# Patient Record
Sex: MALE | Race: WHITE
[De-identification: names, ages, dates, MRNs, and addresses within clinical notes are randomized per-mention and may not be internally consistent; named-entity substitution may affect disease eponyms.]

---

## 2018-07-31 ENCOUNTER — HOSPITAL ENCOUNTER (INPATIENT)
Dept: HOSPITAL 45 - C.EDA | Age: 68
LOS: 8 days | Discharge: ANOTHER HOSPITAL | DRG: 286 | End: 2018-08-08
Attending: HOSPITALIST | Admitting: HOSPITALIST
Payer: COMMERCIAL

## 2018-07-31 VITALS
WEIGHT: 272.93 LBS | HEIGHT: 67 IN | BODY MASS INDEX: 42.84 KG/M2 | BODY MASS INDEX: 42.84 KG/M2 | WEIGHT: 272.93 LBS | BODY MASS INDEX: 42.84 KG/M2 | HEIGHT: 67 IN

## 2018-07-31 VITALS — HEART RATE: 102 BPM | SYSTOLIC BLOOD PRESSURE: 110 MMHG | DIASTOLIC BLOOD PRESSURE: 73 MMHG

## 2018-07-31 VITALS
OXYGEN SATURATION: 93 % | DIASTOLIC BLOOD PRESSURE: 71 MMHG | HEART RATE: 100 BPM | SYSTOLIC BLOOD PRESSURE: 104 MMHG | TEMPERATURE: 98.96 F

## 2018-07-31 VITALS
SYSTOLIC BLOOD PRESSURE: 120 MMHG | TEMPERATURE: 99.5 F | DIASTOLIC BLOOD PRESSURE: 75 MMHG | OXYGEN SATURATION: 92 % | HEART RATE: 112 BPM

## 2018-07-31 VITALS — HEART RATE: 116 BPM | TEMPERATURE: 98.78 F | DIASTOLIC BLOOD PRESSURE: 70 MMHG | SYSTOLIC BLOOD PRESSURE: 113 MMHG

## 2018-07-31 DIAGNOSIS — E03.9: ICD-10-CM

## 2018-07-31 DIAGNOSIS — I70.232: ICD-10-CM

## 2018-07-31 DIAGNOSIS — L03.116: ICD-10-CM

## 2018-07-31 DIAGNOSIS — Z87.891: ICD-10-CM

## 2018-07-31 DIAGNOSIS — I35.0: Primary | ICD-10-CM

## 2018-07-31 DIAGNOSIS — K56.7: ICD-10-CM

## 2018-07-31 DIAGNOSIS — I25.10: ICD-10-CM

## 2018-07-31 DIAGNOSIS — Z79.01: ICD-10-CM

## 2018-07-31 DIAGNOSIS — I11.0: ICD-10-CM

## 2018-07-31 DIAGNOSIS — I48.2: ICD-10-CM

## 2018-07-31 DIAGNOSIS — I24.8: ICD-10-CM

## 2018-07-31 DIAGNOSIS — I48.0: ICD-10-CM

## 2018-07-31 DIAGNOSIS — I70.242: ICD-10-CM

## 2018-07-31 DIAGNOSIS — E11.65: ICD-10-CM

## 2018-07-31 DIAGNOSIS — I50.31: ICD-10-CM

## 2018-07-31 LAB
ALBUMIN SERPL-MCNC: 3.9 GM/DL (ref 3.4–5)
ALP SERPL-CCNC: 59 U/L (ref 45–117)
ALT SERPL-CCNC: 37 U/L (ref 12–78)
AST SERPL-CCNC: 53 U/L (ref 15–37)
BASOPHILS # BLD: 0.03 K/UL (ref 0–0.2)
BASOPHILS NFR BLD: 0.2 %
BUN SERPL-MCNC: 28 MG/DL (ref 7–18)
CALCIUM SERPL-MCNC: 9.5 MG/DL (ref 8.5–10.1)
CO2 SERPL-SCNC: 21 MMOL/L (ref 21–32)
CREAT SERPL-MCNC: 1.16 MG/DL (ref 0.6–1.4)
EOS ABS #: 0.05 K/UL (ref 0–0.5)
EOSINOPHIL NFR BLD AUTO: 140 K/UL (ref 130–400)
GLUCOSE SERPL-MCNC: 226 MG/DL (ref 70–99)
HCT VFR BLD CALC: 51.7 % (ref 42–52)
HGB BLD-MCNC: 17.7 G/DL (ref 14–18)
IG#: 0.08 K/UL (ref 0–0.02)
IMM GRANULOCYTES NFR BLD AUTO: 4 %
INR PPP: 1.4 (ref 0.9–1.1)
LIPASE: 341 U/L (ref 73–393)
LYMPHOCYTES # BLD: 0.7 K/UL (ref 1.2–3.4)
MCH RBC QN AUTO: 29.9 PG (ref 25–34)
MCHC RBC AUTO-ENTMCNC: 34.2 G/DL (ref 32–36)
MCV RBC AUTO: 87.5 FL (ref 80–100)
MONO ABS #: 0.9 K/UL (ref 0.11–0.59)
MONOCYTES NFR BLD: 5.1 %
NEUT ABS #: 15.75 K/UL (ref 1.4–6.5)
NEUTROPHILS # BLD AUTO: 0.3 %
NEUTROPHILS NFR BLD AUTO: 89.9 %
PMV BLD AUTO: 12.6 FL (ref 7.4–10.4)
POTASSIUM SERPL-SCNC: 3.9 MMOL/L (ref 3.5–5.1)
PROT SERPL-MCNC: 8.9 GM/DL (ref 6.4–8.2)
RED CELL DISTRIBUTION WIDTH CV: 15.8 % (ref 11.5–14.5)
RED CELL DISTRIBUTION WIDTH SD: 49.9 FL (ref 36.4–46.3)
SODIUM SERPL-SCNC: 130 MMOL/L (ref 136–145)
SODIUM SERPL-SCNC: 130 MMOL/L (ref 136–145)
WBC # BLD AUTO: 17.51 K/UL (ref 4.8–10.8)

## 2018-07-31 RX ADMIN — DILTIAZEM HYDROCHLORIDE PRN MLS/HR: 5 INJECTION INTRAVENOUS at 17:07

## 2018-07-31 RX ADMIN — BUPROPION HYDROCHLORIDE SCH MG: 150 TABLET, EXTENDED RELEASE ORAL at 21:40

## 2018-07-31 RX ADMIN — Medication SCH MG: at 21:39

## 2018-07-31 RX ADMIN — DILTIAZEM HYDROCHLORIDE PRN MLS/HR: 5 INJECTION INTRAVENOUS at 18:39

## 2018-07-31 RX ADMIN — DILTIAZEM HYDROCHLORIDE PRN MLS/HR: 5 INJECTION INTRAVENOUS at 17:38

## 2018-07-31 RX ADMIN — DABIGATRAN ETEXILATE MESYLATE SCH MG: 75 CAPSULE ORAL at 21:40

## 2018-07-31 RX ADMIN — METOPROLOL TARTRATE SCH MG: 25 TABLET, FILM COATED ORAL at 21:41

## 2018-07-31 RX ADMIN — GEMFIBROZIL SCH MG: 600 TABLET ORAL at 21:40

## 2018-07-31 NOTE — DIAGNOSTIC IMAGING REPORT
ABDOMEN AND PELVIS CT WITH IV CONTRAST



CT DOSE: 1446.92 mGy.cm



HISTORY: Acute nausea with ileus  nausea, ?ileus on xray



TECHNIQUE: Multiaxial CT images of the abdomen and pelvis were performed

following the use of intravenous contrast.  A dose lowering technique was

utilized adhering to the principles of ALARA.



COMPARISON STUDY: Acute abdominal series radiographs of same day.



FINDINGS: 

Motion degraded exam. Lung bases are generally clear. No pneumatosis or

pneumoperitoneum. Imaged inferior cardiac chambers are moderately enlarged.

Mitral annular calcifications noted.



Prior cholecystectomy. Liver is mildly enlarged. No focal hepatic mass lesions

or intrahepatic biliary ductal dilation. The spleen, pancreas and right adrenal

gland are unremarkable. 9 mm fatty attenuating lesion about the left adrenal

gland compatible with adrenal myolipoma. There are several nonenlarged

periaortic lymph nodes which are likely physiologic.



Patent portal vein. Moderate calcification of the aorta without aneurysm. IVC is

unremarkable. Kidneys, ureters and bladder are unremarkable. Prostate is mildly

enlarged. Calcifications of the vas deferens. No bowel obstruction. There are a

few nondilated fluid-filled loops of small bowel within the central abdomen with

air-fluid levels. There is moderate volume of formed stool throughout the

sigmoid. 1.5 cm fatty attenuating focus of the hepatic flexure suggests

submucosal lipoma, image 201 series 3. The appendix appears normal within the

abdominal right lower quadrant. There is no ascites or mesenteric inflammatory

changes identified. Soft tissues are unremarkable. Bones appear intact.

Multilevel facet arthropathy with annular disc bulging without the lumbar spine.

Grade 1 anterolisthesis L4 on L5, likely secondary to advanced facet arthrosis.



IMPRESSION: 



1. No bowel obstruction or focal bowel wall thickening. There are however a few

loops of nondilated fluid-filled small bowel with air-fluid levels which may be

physiologic or reflect ileus or enteritis.

2. Prior cholecystectomy.

3. Cardiomegaly.

4. Additional findings as above. 







Electronically signed by:  Prasanth Marquez M.D.

7/31/2018 5:51 PM



Dictated Date/Time:  7/31/2018 5:42 PM

## 2018-07-31 NOTE — EMERGENCY ROOM VISIT NOTE
History


Report prepared by Albaro:  Cesar Rao


Under the Supervision of:  Dr. Chelsey Yadav D.O.


First contact with patient:  14:20


Chief Complaint:  CARDIAC ASSESSMENT


Stated Complaint:  NAUSEA/WEAKNESS





History of Present Illness


The patient is a 67 year old male who presents to the Emergency Room with 

complaints of constant nausea beginning this morning. The patient states that 

he has not felt well for the entire day today due to his nerves. He notes that 

he went to the [facility] vein clinic and became nauseous as he had an US on his legs 

done to check blood flow as he has chronic leg wounds that are slow to heal. He 

reports that he only had Cheerios for breakfast this morning and he states that 

he has not eaten much since. He also complains of some mild SOB, dizziness, and 

a bad taste in his mouth. He denies any vomiting, chest pain, fever, chills, 

cough, and other pain. Per EMS, the patient has been having uncontrolled afib 

between . EMS notes that the patient's blood sugar was 288 which is high 

for the patient. EMS reports that the patient's oxygen saturation has been 94% 

on room air. EMS states that the patient was given four of Zofran en route to 

the emergency department. The patient notes that he had black stools five weeks 

ago. He reports that he had black stools for a week and he states that he is 

currently having brown stools. He notes that he has not had any alcohol in 

weeks. He reports that he has a previous history of afib that required 

cardioversion, as well as a heart murmur. He states that he has had two 

previous cardiac catheterizations but did not have any stents placed. He notes 

that he takes Pradaxa and metoprolol.





   Source of History:  patient, EMS


   Onset:  this morning


   Position:  abdomen


   Quality:  other (nausea)


   Timing:  constant


   Associated Symptoms:  + SOB, No fevers, No chills, No cough, No chest pain, 

No vomiting, No melena


Note:


The patient also complains of dizziness and a bad taste in his mouth. He denies 

any other pain.





Review of Systems


See HPI for pertinent positives & negatives. A total of 10 systems reviewed and 

were otherwise negative.





Past Medical & Surgical


Medical Problems:


(1) A-fib


(2) Afib


(3) Murmur








Family History


No pertinent family history stated.





Social History


Alcohol Use:  none


Marital Status:  single


Occupation Status:  employed





Current/Historical Medications


Scheduled


Allopurinol (Zyloprim), 100 MG PO DAILY


Bupropion (Wellbutrin Sr), 150 MG PO BID


Canagliflozin-Metformin HCl (Invokamet 150-500 mg), 1 TAB PO BID


Dabigatran Etexilate Mesylate (Pradaxa), 150 MG PO BID


Furosemide (Lasix), 40 MG PO DAILY


Gemfibrozil (Lopid), 600 MG PO BID


Glimepiride (Glimepiride), 4 MG PO DAILY


Hctz/Losartan (Hyzaar 12.5MG/50MG), 1 TAB PO DAILY


Levothyroxine Sodium (Levothyroxine Sodium), 50 MCG PO DAILY


Magnesium Oxide (Mag-Ox), 400 MG PO BID


Metoprolol Tartrate (Lopressor) (Lopressor), 25 MG PO BID


Multivitamin (Multivitamin), 1 TAB PO DAILY


Sitagliptin Phosphate (Januvia), 100 MG PO DAILY





Scheduled PRN


Indomethacin (Indocin), 50 MG PO PRN PRN for Pain





Allergies


Coded Allergies:  


     No Known Allergies (Unverified , 7/31/18)





Physical Exam


Vital Signs











  Date Time  Temp Pulse Resp B/P (MAP) Pulse Ox O2 Delivery O2 Flow Rate FiO2


 


7/31/18 17:10  137      


 


7/31/18 17:07  128 20 116/73 94 Room Air  


 


7/31/18 16:34  120 22 124/95 96 Room Air  


 


7/31/18 16:14  113 22 107/79 95 Room Air  


 


7/31/18 15:54  119  119/97    


 


7/31/18 15:52  119 26 119/97 96 Room Air  


 


7/31/18 15:17  104 16 114/76 95 Room Air  


 


7/31/18 14:49  124  121/73    


 


7/31/18 14:32  124      


 


7/31/18 14:26 37.8 124 18 121/73 95 Room Air  


 


7/31/18 14:26     95 Room Air  


 


7/31/18 14:26     95 Room Air  











Physical Exam


GENERAL: alert, well appearing, well nourished, no distress, non-toxic, obese. 


EYE EXAM: normal conjunctiva, PERRL and EOM's grossly intact


OROPHARYNX: no exudate, no erythema, lips, buccal mucosa, and tongue normal and 

mucous membranes are moist


NECK: supple, no nuchal rigidity, no adenopathy, non-tender


LUNGS: Clear to auscultation. Normal chest wall mechanics. Breath sounds 

diminished, no wheezes, rhonchi, and rales. 


HEART: no murmurs, S1 normal and S2 normal, fast and irregular.


ABDOMEN: abdomen soft, non-tender, normo-active bowel sounds, no masses, no 

rebound or guarding.


BACK: Back is symmetrical on inspection and there is no deformity, no midline 

tenderness, no CVA tenderness. 


SKIN: no rashes and no bruising 


UPPER EXTREMITIES: upper extremities are grossly normal. 


LOWER EXTREMITIES: No pitting edema.


NEURO EXAM: Normal sensorium, cranial nerves II-XII grossly intact, normal 

speech,  no gross weakness of arms, no gross weakness of legs.





Medical Decision & Procedures


ER Provider


Diagnostic Interpretation:


Radiology results have been interpreted by the radiologist and reviewed by me.





ABDOMEN 2VIEW W/PA CHEST RTN





FINDINGS: The soft tissues, psoas shadows, renal outlines and intestinal gas


pattern appear normal. There is no evidence for bowel obstruction. There is no


evidence for free intraperitoneal air. No abnormal abdominal calcifications are


seen. A frontal view of the chest was performed and is unremarkable. Mild


cardiomegaly. Mild nonobstructive ileus.





IMPRESSION:  Mild cardiomegaly. Mild nonobstructive ileus. 





The above report was generated using voice recognition software.  It may contain


grammatical, syntax or spelling errors.





Electronically signed by:  Tomas Brantley M.D.


7/31/2018 3:49 PM





Dictated Date/Time:  7/31/2018 3:47 PM





ABDOMEN AND PELVIS CT WITH IV CONTRAST





FINDINGS: 


Motion degraded exam. Lung bases are generally clear. No pneumatosis or


pneumoperitoneum. Imaged inferior cardiac chambers are moderately enlarged.


Mitral annular calcifications noted.





Prior cholecystectomy. Liver is mildly enlarged. No focal hepatic mass lesions


or intrahepatic biliary ductal dilation. The spleen, pancreas and right adrenal


gland are unremarkable. 9 mm fatty attenuating lesion about the left adrenal


gland compatible with adrenal myolipoma. There are several nonenlarged


periaortic lymph nodes which are likely physiologic.





Patent portal vein. Moderate calcification of the aorta without aneurysm. IVC is


unremarkable. Kidneys, ureters and bladder are unremarkable. Prostate is mildly


enlarged. Calcifications of the vas deferens. No bowel obstruction. There are a


few nondilated fluid-filled loops of small bowel within the central abdomen with


air-fluid levels. There is moderate volume of formed stool throughout the


sigmoid. 1.5 cm fatty attenuating focus of the hepatic flexure suggests


submucosal lipoma, image 201 series 3. The appendix appears normal within the


abdominal right lower quadrant. There is no ascites or mesenteric inflammatory


changes identified. Soft tissues are unremarkable. Bones appear intact.


Multilevel facet arthropathy with annular disc bulging without the lumbar spine.


Grade 1 anterolisthesis L4 on L5, likely secondary to advanced facet arthrosis.





IMPRESSION: 





1. No bowel obstruction or focal bowel wall thickening. There are however a few


loops of nondilated fluid-filled small bowel with air-fluid levels which may be


physiologic or reflect ileus or enteritis.


2. Prior cholecystectomy.


3. Cardiomegaly.


4. Additional findings as above. 





Electronically signed by:  Prasanth Marquez M.D.


7/31/2018 5:51 PM





Dictated Date/Time:  7/31/2018 5:42 PM





Laboratory Results


7/31/18 14:29








Red Blood Count 5.91, Mean Corpuscular Volume 87.5, Mean Corpuscular Hemoglobin 

29.9, Mean Corpuscular Hemoglobin Concent 34.2, Mean Platelet Volume 12.6, 

Neutrophils (%) (Auto) 89.9, Lymphocytes (%) (Auto) 4.0, Monocytes (%) (Auto) 

5.1, Eosinophils (%) (Auto) 0.3, Basophils (%) (Auto) 0.2, Neutrophils # (Auto) 

15.75, Lymphocytes # (Auto) 0.70, Monocytes # (Auto) 0.90, Eosinophils # (Auto) 

0.05, Basophils # (Auto) 0.03





7/31/18 14:29

















Test


  7/31/18


14:29 7/31/18


17:03


 


White Blood Count


  17.51 K/uL


(4.8-10.8) 


 


 


Red Blood Count


  5.91 M/uL


(4.7-6.1) 


 


 


Hemoglobin


  17.7 g/dL


(14.0-18.0) 


 


 


Hematocrit 51.7 % (42-52)  


 


Mean Corpuscular Volume


  87.5 fL


() 


 


 


Mean Corpuscular Hemoglobin


  29.9 pg


(25-34) 


 


 


Mean Corpuscular Hemoglobin


Concent 34.2 g/dl


(32-36) 


 


 


Platelet Count


  140 K/uL


(130-400) 


 


 


Mean Platelet Volume


  12.6 fL


(7.4-10.4) 


 


 


Neutrophils (%) (Auto) 89.9 %  


 


Lymphocytes (%) (Auto) 4.0 %  


 


Monocytes (%) (Auto) 5.1 %  


 


Eosinophils (%) (Auto) 0.3 %  


 


Basophils (%) (Auto) 0.2 %  


 


Neutrophils # (Auto)


  15.75 K/uL


(1.4-6.5) 


 


 


Lymphocytes # (Auto)


  0.70 K/uL


(1.2-3.4) 


 


 


Monocytes # (Auto)


  0.90 K/uL


(0.11-0.59) 


 


 


Eosinophils # (Auto)


  0.05 K/uL


(0-0.5) 


 


 


Basophils # (Auto)


  0.03 K/uL


(0-0.2) 


 


 


RDW Standard Deviation


  49.9 fL


(36.4-46.3) 


 


 


RDW Coefficient of Variation


  15.8 %


(11.5-14.5) 


 


 


Immature Granulocyte % (Auto) 0.5 %  


 


Immature Granulocyte # (Auto)


  0.08 K/uL


(0.00-0.02) 


 


 


Prothrombin Time


  14.2 SECONDS


(9.0-12.0) 


 


 


Prothromb Time International


Ratio 1.4 (0.9-1.1) 


  


 


 


Anion Gap


  12.0 mmol/L


(3-11) 


 


 


BUN/Creatinine Ratio 24.1 (10-20)  


 


Calcium Level


  9.5 mg/dl


(8.5-10.1) 


 


 


Magnesium Level


  2.1 mg/dl


(1.8-2.4) 


 


 


Total Bilirubin


  0.9 mg/dl


(0.2-1) 


 


 


Aspartate Amino Transf


(AST/SGOT) 53 U/L (15-37) 


  


 


 


Alanine Aminotransferase


(ALT/SGPT) 37 U/L (12-78) 


  


 


 


Alkaline Phosphatase


  59 U/L


() 


 


 


Pro-B-Type Natriuretic Peptide


  2131 pg/ml


(0-900) 


 


 


Total Protein


  8.9 gm/dl


(6.4-8.2) 


 


 


Albumin


  3.9 gm/dl


(3.4-5.0) 


 


 


Globulin


  5.0 gm/dl


(2.5-4.0) 


 


 


Albumin/Globulin Ratio 0.8 (0.9-2)  


 


Lipase


  341 U/L


() 


 


 


Thyroid Stimulating Hormone


(TSH) 0.880 uIu/ml


(0.300-4.500) 


 


 


Urine Color  YELLOW 


 


Urine Appearance  CLEAR (CLEAR) 


 


Urine pH  5.0 (4.5-7.5) 


 


Urine Specific Gravity


  


  1.032


(1.000-1.030)


 


Urine Protein  1+ (NEG) 


 


Urine Glucose (UA)  3+ (NEG) 


 


Urine Ketones  NEG (NEG) 


 


Urine Occult Blood  NEG (NEG) 


 


Urine Nitrite  NEG (NEG) 


 


Urine Bilirubin  NEG (NEG) 


 


Urine Urobilinogen  NEG (NEG) 


 


Urine Leukocyte Esterase  NEG (NEG) 


 


Urine WBC (Auto)  0 /hpf (0-5) 


 


Urine RBC (Auto)  0-4 /hpf (0-4) 


 


Urine Hyaline Casts (Auto)  0 /lpf (0-5) 


 


Urine Epithelial Cells (Auto)  0-5 /lpf (0-5) 


 


Urine Bacteria (Auto)  NEG (NEG) 





Laboratory results per my review.





Medications Administered











 Medications


  (Trade)  Dose


 Ordered  Sig/Bella


 Route  Start Time


 Stop Time Status Last Admin


Dose Admin


 


 Sodium Chloride  500 ml @ 


 999 mls/hr  Q31M STAT


 IV  7/31/18 14:38


 7/31/18 15:08 DC 7/31/18 14:51


999 MLS/HR


 


 Ondansetron HCl


  (Zofran Inj)  4 mg  NOW  STAT


 IV  7/31/18 14:38


 7/31/18 14:40 DC 7/31/18 14:52


4 MG


 


 Metoprolol


 Tartrate


  (Lopressor Iv)  5 mg  NOW  STAT


 IV  7/31/18 14:40


 7/31/18 14:41 DC 7/31/18 14:49


5 MG


 


 Metoprolol


 Tartrate


  (Lopressor Iv)  5 mg  NOW  STAT


 IV  7/31/18 15:37


 7/31/18 15:38 DC 7/31/18 15:54


5 MG


 


 Sodium Chloride  1,000 ml @ 


 125 mls/hr  Q8H STAT


 IV  7/31/18 16:37


 7/31/18 18:22 DC 7/31/18 17:02


125 MLS/HR


 


 Ondansetron HCl


  (Zofran Inj)  4 mg  NOW  STAT


 IV  7/31/18 16:40


 7/31/18 16:42 DC 7/31/18 17:02


4 MG


 


 Diltiazem HCl 125


 mg/Dextrose  125 ml @ 0


 mls/hr  Q0M PRN


 IV  7/31/18 16:45


 8/1/18 11:50 DC 8/1/18 00:32


10 MLS/HR


 


 Insulin Human


 Regular


  (novoLIN-R U-100


 PER UNIT)  6 units  NOW  STAT


 SC  7/31/18 16:56


 7/31/18 16:57 DC 7/31/18 17:04


6 UNITS











ECG Per My Interpretation


Indication:  nausea


Rate (beats per minute):  124


Rhythm:  atrial fibrillation


Findings:  no acute ischemic change, other (Frequent PVCs, normal axis, normal 

QRS and QTC, Q waves in V2)





ED Course


1421: The patient was evaluated in room A3. A complete history and physical 

exam was performed. 





1438: Zofran Inj 4mg IV, Sodium Chloride 500 ml @ 999 mls/hr IV





1440: Metoprolol Tartrate 5mg IV





1511: I reevaluated and updated the patient. His heart rate is slower in the 

110s. He states that he has not noticed any significant differences in his 

symptoms. 





1537: Metoprolol Tartrate 5mg IV





1615: I rechecked the patient. He still looks the same and he notes that he 

does not feel better. His heart rate is the same. 





1623: I reviewed records from [place]. Per [place] records, the patient had a 

cardiac cath in August, 2014. The records also show that the patient had normal 

coronary arteries, moderate-severe aortic stenosis, mild pulmonary hypertension

, and a TTE done at that time. The patient had an LVF of 15-55% and moderate LV 

wall thickness. 





1640: Zofran Inj 4mg IV





1642: I reevaluated and updated the patient. 





1656: Insulin Human Regular 6 units SC





1704: Upon reevaluation, the patient is stable. I discussed the findings and 

the treatment plan with the patient.  He expresses agreement and understanding.

  I spoke with Juanita GREGORY of the [facility].  She 

will be evaluated for further management.





Medical Decision


Differential diagnosis:


Etiologies such as gastroenteritis, food borne illness, infections, appendicitis

, diverticulitis, inflammatory bowel disease, obstruction, GI bleed, biliary 

pathology, as well as others were entertained.





Patient with atypical presentation and found to be in rapid A. fib.  Patient 

with prior history of A. fib and takes metoprolol and Pradaxa.  Patient denies 

any recent changes.  Denied any recent sick contacts, medication change, or 

dietary change otherwise explain acute nausea experience today which prompted 

the call 911 and transportation emergency room.  Patient's nausea was slightly 

improved with Zofran and fluids, did not seem to improve at all with rate 

control.  Patient initially given IV metoprolol given that he uses metoprolol 

orally daily.  This would help with rate control but he would then escalate 

again.  Patient then changed to diltiazem drip.  Patient's labs otherwise 

reassuring.  Leukocytosis thought initially to be secondary to stress reaction.

  Patient never had any abdominal pain, states bowel movements over the last 

week have been normal.  As a precaution given the persistence of nausea patient 

sent for CAT scan which was also reassuring and only suggestive of possible 

mild ileus.  Patient states his leg wounds are chronic and are slow to heal 

however have not been getting significantly worse.  Denies any fevers or 

chills.  Discussed with patient admission for additional evaluation, better 

control of his heart rate, and additional medication for his nausea, patient 

was in agreement with this.  Patient aware of all results.  Records were 

obtained from [place] from a cardiac catheterization as well as echo in 2014 

which were reviewed and placed on his chart.  Case discussed with hospitalist 

for additional evaluation and management.  I do not suspect bacteremia/sepsis, 

dissection.  Prior echo was suggestive of moderate aortic stenosis which I 

discussed with patient might require a repeat echo for further evaluation as 

this could contribute to shortness of breath.  Patient seemed mildly short of 

breath here with exertion.  No evidence of pneumonia, acute congestive heart 

failure.  Despite elevated troponin I do not suspect ACS.  I feel more likely 

this is secondary to the rapid rate associated with the atrial fibrillation.  

Patient's EKG reassuring other than atrial fibrillation which is chronic.





Medication Reconcilliation


Current Medication List:  was personally reviewed by me





Blood Pressure Screening


Patient's blood pressure:  Normal blood pressure


Blood pressure disposition:  Did not require urgent referral





Consults


Time Called:  1648


Consulting Physician:  Juanita GREGORY, [facility]


Returned Call:  [phone]


I reviewed the patient's case with Juanita Worthy.  She will evaluate the patient 

for further management.





Impression





 Primary Impression:  


 Atrial fibrillation with rapid ventricular response


 Additional Impressions:  


 Nausea


 Ileus





Critical Care


I have personally spent greater than 45 minutes of critical care time in the 

direct management of this patient.  This includes bedside care, interpretation 

of diagnostic studies, and testing, discussion with consultants, patient, and 

family members, and other required patient management activities.  This 45 

minutes is in excess of all separately billable procedures.





Scribe Attestation


The scribe's documentation has been prepared under my direction and personally 

reviewed by me in its entirety. I confirm that the note above accurately 

reflects all work, treatment, procedures, and medical decision making performed 

by me.





Departure Information


Dispostion


Being Evaluated By Hospitalist





Patient Instructions


My [facility]





Problem Qualifiers
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
## 2018-07-31 NOTE — DIAGNOSTIC IMAGING REPORT
ABDOMEN 2VIEW W/PA CHEST RTN



CLINICAL HISTORY: nausea, sob nausea



COMPARISON STUDY:  No previous studies for comparison. 



FINDINGS: The soft tissues, psoas shadows, renal outlines and intestinal gas

pattern appear normal. There is no evidence for bowel obstruction. There is no

evidence for free intraperitoneal air. No abnormal abdominal calcifications are

seen. A frontal view of the chest was performed and is unremarkable. Mild

cardiomegaly. Mild nonobstructive ileus.



IMPRESSION:  Mild cardiomegaly. Mild nonobstructive ileus. 











The above report was generated using voice recognition software.  It may contain

grammatical, syntax or spelling errors.







Electronically signed by:  Tomas Brantley M.D.

7/31/2018 3:49 PM



Dictated Date/Time:  7/31/2018 3:47 PM

## 2018-07-31 NOTE — DIAGNOSTIC IMAGING REPORT
ART DOP LOWER EXT BILAT



HISTORY:  67 years-old Male R/O PERIPHERAL VASCULAR DISEASE acute pain of the

lower legs with peripheral arterial disease



COMPARISON: Left tibia and fibula radiographs of same day



TECHNIQUE: Multiple real-time sonographic images of the bilateral lower

extremity arterial structures were obtained assessing grayscale appearance,

color and spectral flow



FINDINGS: 



RIGHT:

Patent triphasic waveforms noted within the common femoral, profunda femoris,

superficial femoral and popliteal arteries. Blunted biphasic waveforms within

the posterior tibial artery. The mid right peroneal artery is not visualized.

Severely blunted monophasic waveforms within the distal peroneal artery.

Biphasic waveforms are seen within the anterior tibial and dorsalis pedis artery

with areas of blunted monophasic waveforms within the mid and distal anterior

tibial artery. No significantly elevated peak systolic velocities.



LEFT:

 Patent triphasic waveforms within the common femoral artery. Blunted monophasic

waveforms within the profunda femoris artery. Triphasic waveforms within the

superficial femoral and popliteal arteries. Mildly blunted biphasic waveforms

within the posterior tibial artery with biphasic waveforms seen within the

dorsalis pedis and proximal anterior tibial artery. Monophasic waveforms within

the mid and distal anterior tibial artery. Left peroneal artery is not

diagnostically visualized. No significantly elevated peak systolic velocities.



IMPRESSION: 

1. Patent triphasic waveforms are seen within the upper legs bilaterally.

2. Areas of monophasic and biphasic waveforms of the bilateral lower legs as

above suggests underlying peripheral arterial disease.

3. No significantly elevated peak systolic velocities identified to suggest

high-grade stenosis.

4. Severely blunted monophasic waveforms within the right distal peroneal artery

with the mid right peroneal and entire left peroneal artery not diagnostically

visualized.



The above report was generated using voice recognition software. It may contain

grammatical, syntax or spelling errors.







Electronically signed by:  Prasanth Marquez M.D.

7/31/2018 9:35 PM



Dictated Date/Time:  7/31/2018 9:27 PM

## 2018-07-31 NOTE — DIAGNOSTIC IMAGING REPORT
L TIBIA/FIBULA 2 VIEWS ROUTINE



HISTORY:  67 years-old Male R/O OSTEOMYELITIS acute pain and swelling of the

left lower leg



COMPARISON: None available



TECHNIQUE: 2 views of the left tibia and fibula.



FINDINGS: 

Mild degenerative changes about the knee and ankle. No acute fracture,

dislocation or erosive changes. Mild soft tissue swelling about the lower leg

and ankle. Extensive peripheral arterial calcifications are noted.



IMPRESSION: Soft tissue swelling without acute bony abnormality. 







The above report was generated using voice recognition software. It may contain

grammatical, syntax or spelling errors.







Electronically signed by:  Prasanth Marquez M.D.

7/31/2018 7:52 PM



Dictated Date/Time:  7/31/2018 7:50 PM

## 2018-07-31 NOTE — PHARMACY PROGRESS NOTE
Pharmacy Antibiotic Consult


Date of Service:


Jul 31, 2018.


Pharmacy Dosing Scope


Pharmacy is consulted to initiate vancomycin/zosyn IV dosing therapy, order 

appropriate labs and adjust drug dose/frequency.





Subjective


The patient is a 67 year old male admitted on Jul 31, 2018 at 17:31.





Objective


Height (Feet):  5


Height (Inches):  10.00


Weight (Kilograms):  130.500


Lab Results (24hrs):











Test


  7/31/18


14:29 7/31/18


17:03 7/31/18


19:26


 


White Blood Count


  17.51 K/uL


(4.8-10.8) 


  


 


 


Red Blood Count


  5.91 M/uL


(4.7-6.1) 


  


 


 


Hemoglobin


  17.7 g/dL


(14.0-18.0) 


  


 


 


Hematocrit 51.7 % (42-52)   


 


Mean Corpuscular Volume


  87.5 fL


() 


  


 


 


Mean Corpuscular Hemoglobin


  29.9 pg


(25-34) 


  


 


 


Mean Corpuscular Hemoglobin


Concent 34.2 g/dl


(32-36) 


  


 


 


Platelet Count


  140 K/uL


(130-400) 


  


 


 


Mean Platelet Volume


  12.6 fL


(7.4-10.4) 


  


 


 


Neutrophils (%) (Auto) 89.9 %   


 


Lymphocytes (%) (Auto) 4.0 %   


 


Monocytes (%) (Auto) 5.1 %   


 


Eosinophils (%) (Auto) 0.3 %   


 


Basophils (%) (Auto) 0.2 %   


 


Neutrophils # (Auto)


  15.75 K/uL


(1.4-6.5) 


  


 


 


Lymphocytes # (Auto)


  0.70 K/uL


(1.2-3.4) 


  


 


 


Monocytes # (Auto)


  0.90 K/uL


(0.11-0.59) 


  


 


 


Eosinophils # (Auto)


  0.05 K/uL


(0-0.5) 


  


 


 


Basophils # (Auto)


  0.03 K/uL


(0-0.2) 


  


 


 


RDW Standard Deviation


  49.9 fL


(36.4-46.3) 


  


 


 


RDW Coefficient of Variation


  15.8 %


(11.5-14.5) 


  


 


 


Immature Granulocyte % (Auto) 0.5 %   


 


Immature Granulocyte # (Auto)


  0.08 K/uL


(0.00-0.02) 


  


 


 


Prothrombin Time


  14.2 SECONDS


(9.0-12.0) 


  


 


 


Prothromb Time International


Ratio 1.4 (0.9-1.1) 


  


  


 


 


Sodium Level


  130 mmol/L


(136-145) 


  


 


 


Potassium Level


  3.9 mmol/L


(3.5-5.1) 


  


 


 


Chloride Level


  97 mmol/L


() 


  


 


 


Carbon Dioxide Level


  21 mmol/L


(21-32) 


  


 


 


Anion Gap


  12.0 mmol/L


(3-11) 


  


 


 


Blood Urea Nitrogen


  28 mg/dl


(7-18) 


  


 


 


Creatinine


  1.16 mg/dl


(0.60-1.40) 


  


 


 


Est Creatinine Clear Calc


Drug Dose 82.8 ml/min 


  


  


 


 


Estimated GFR (


American) 74.6 


  


  


 


 


Estimated GFR (Non-


American 64.3 


  


  


 


 


BUN/Creatinine Ratio 24.1 (10-20)   


 


Random Glucose


  226 mg/dl


(70-99) 


  


 


 


Calcium Level


  9.5 mg/dl


(8.5-10.1) 


  


 


 


Magnesium Level


  2.1 mg/dl


(1.8-2.4) 


  


 


 


Total Bilirubin


  0.9 mg/dl


(0.2-1) 


  


 


 


Aspartate Amino Transf


(AST/SGOT) 53 U/L (15-37) 


  


  


 


 


Alanine Aminotransferase


(ALT/SGPT) 37 U/L (12-78) 


  


  


 


 


Alkaline Phosphatase


  59 U/L


() 


  


 


 


Troponin I


  0.078 ng/ml


(0-0.045) 


  


 


 


Pro-B-Type Natriuretic Peptide


  2131 pg/ml


(0-900) 


  


 


 


Total Protein


  8.9 gm/dl


(6.4-8.2) 


  


 


 


Albumin


  3.9 gm/dl


(3.4-5.0) 


  


 


 


Globulin


  5.0 gm/dl


(2.5-4.0) 


  


 


 


Albumin/Globulin Ratio 0.8 (0.9-2)   


 


Lipase


  341 U/L


() 


  


 


 


Thyroid Stimulating Hormone


(TSH) 0.880 uIu/ml


(0.300-4.500) 


  


 


 


Urine Color  YELLOW  


 


Urine Appearance  CLEAR (CLEAR)  


 


Urine pH  5.0 (4.5-7.5)  


 


Urine Specific Gravity


  


  1.032


(1.000-1.030) 


 


 


Urine Protein  1+ (NEG)  


 


Urine Glucose (UA)  3+ (NEG)  


 


Urine Ketones  NEG (NEG)  


 


Urine Occult Blood  NEG (NEG)  


 


Urine Nitrite  NEG (NEG)  


 


Urine Bilirubin  NEG (NEG)  


 


Urine Urobilinogen  NEG (NEG)  


 


Urine Leukocyte Esterase  NEG (NEG)  


 


Urine WBC (Auto)  0 /hpf (0-5)  


 


Urine RBC (Auto)  0-4 /hpf (0-4)  


 


Urine Hyaline Casts (Auto)  0 /lpf (0-5)  


 


Urine Epithelial Cells (Auto)  0-5 /lpf (0-5)  


 


Urine Bacteria (Auto)  NEG (NEG)  








Micro Results:











 Date/Time


Source Procedure


Growth Status


 


 


 7/31/18 19:26


Blood Blood Culture


Pending Received


 


 7/31/18 19:26


Blood Blood Culture


Pending Received











Assessment & Plan


Assessment:


68 yo male admitted with A. Fib w/ RVR


Febrile on admission (37.8), WBC elevated (17.8)


Starting vancomycin/zosyn for leg cellulitis. 





Plan:


Loading dose:   2500 mg (20 mg/kg) IV X 1 dose then:





1500 mg (12 mg/kg) IV every 12 hours.


   Population PK: Estimated CrCl 77, T1/2 ~10 hours, Vdf 0.6


   Used less than traditional 15 mg/kg, patients BMI >35, risk for accumulation





Goal trough level estimate:  between 10-20 mcg/mL.





Trough ordered for 8/2 @1930   














Pharmacy will continue to follow and will adjust dose/frequency as necessary.  

Thank you

## 2018-08-01 VITALS
TEMPERATURE: 98.78 F | SYSTOLIC BLOOD PRESSURE: 111 MMHG | HEART RATE: 92 BPM | DIASTOLIC BLOOD PRESSURE: 68 MMHG | OXYGEN SATURATION: 94 %

## 2018-08-01 VITALS
SYSTOLIC BLOOD PRESSURE: 109 MMHG | OXYGEN SATURATION: 96 % | TEMPERATURE: 98.06 F | DIASTOLIC BLOOD PRESSURE: 73 MMHG | HEART RATE: 93 BPM

## 2018-08-01 VITALS
SYSTOLIC BLOOD PRESSURE: 104 MMHG | TEMPERATURE: 98.42 F | HEART RATE: 78 BPM | DIASTOLIC BLOOD PRESSURE: 69 MMHG | OXYGEN SATURATION: 96 %

## 2018-08-01 VITALS
DIASTOLIC BLOOD PRESSURE: 49 MMHG | SYSTOLIC BLOOD PRESSURE: 84 MMHG | TEMPERATURE: 98.78 F | OXYGEN SATURATION: 97 % | HEART RATE: 86 BPM

## 2018-08-01 VITALS
HEART RATE: 83 BPM | OXYGEN SATURATION: 96 % | SYSTOLIC BLOOD PRESSURE: 106 MMHG | DIASTOLIC BLOOD PRESSURE: 66 MMHG | TEMPERATURE: 98.6 F

## 2018-08-01 VITALS
HEART RATE: 108 BPM | DIASTOLIC BLOOD PRESSURE: 71 MMHG | SYSTOLIC BLOOD PRESSURE: 107 MMHG | TEMPERATURE: 99.5 F | OXYGEN SATURATION: 97 %

## 2018-08-01 LAB
CREAT SERPL-MCNC: 1.22 MG/DL (ref 0.6–1.4)
HBA1C MFR BLD: 8.9 % (ref 4.5–5.6)
SODIUM SERPL-SCNC: 133 MMOL/L (ref 136–145)

## 2018-08-01 RX ADMIN — BUPROPION HYDROCHLORIDE SCH MG: 150 TABLET, EXTENDED RELEASE ORAL at 08:20

## 2018-08-01 RX ADMIN — DILTIAZEM HYDROCHLORIDE PRN MLS/HR: 5 INJECTION INTRAVENOUS at 00:32

## 2018-08-01 RX ADMIN — GEMFIBROZIL SCH MG: 600 TABLET ORAL at 08:20

## 2018-08-01 RX ADMIN — DABIGATRAN ETEXILATE MESYLATE SCH MG: 75 CAPSULE ORAL at 20:10

## 2018-08-01 RX ADMIN — LEVOTHYROXINE SODIUM SCH MCG: 50 TABLET ORAL at 05:52

## 2018-08-01 RX ADMIN — INSULIN GLARGINE SCH UNITS: 100 INJECTION, SOLUTION SUBCUTANEOUS at 20:22

## 2018-08-01 RX ADMIN — DOXYCYCLINE HYCLATE SCH MG: 100 CAPSULE, GELATIN COATED ORAL at 20:10

## 2018-08-01 RX ADMIN — Medication SCH MG: at 20:11

## 2018-08-01 RX ADMIN — PIPERACILLIN SODIUM, TAZOBACTAM SODIUM SCH MLS/HR: 4; .5 INJECTION, POWDER, LYOPHILIZED, FOR SOLUTION INTRAVENOUS at 10:22

## 2018-08-01 RX ADMIN — INSULIN ASPART SCH UNITS: 100 INJECTION, SOLUTION INTRAVENOUS; SUBCUTANEOUS at 00:31

## 2018-08-01 RX ADMIN — INSULIN ASPART SCH UNITS: 100 INJECTION, SOLUTION INTRAVENOUS; SUBCUTANEOUS at 17:15

## 2018-08-01 RX ADMIN — METOPROLOL TARTRATE SCH MG: 25 TABLET, FILM COATED ORAL at 08:21

## 2018-08-01 RX ADMIN — DABIGATRAN ETEXILATE MESYLATE SCH MG: 75 CAPSULE ORAL at 08:21

## 2018-08-01 RX ADMIN — GEMFIBROZIL SCH MG: 600 TABLET ORAL at 20:12

## 2018-08-01 RX ADMIN — METOPROLOL TARTRATE SCH MG: 25 TABLET, FILM COATED ORAL at 20:11

## 2018-08-01 RX ADMIN — DOXYCYCLINE HYCLATE SCH MG: 100 CAPSULE, GELATIN COATED ORAL at 14:22

## 2018-08-01 RX ADMIN — BUPROPION HYDROCHLORIDE SCH MG: 150 TABLET, EXTENDED RELEASE ORAL at 20:11

## 2018-08-01 RX ADMIN — INSULIN ASPART SCH UNITS: 100 INJECTION, SOLUTION INTRAVENOUS; SUBCUTANEOUS at 20:20

## 2018-08-01 RX ADMIN — ALLOPURINOL SCH MG: 100 TABLET ORAL at 08:20

## 2018-08-01 RX ADMIN — INSULIN ASPART SCH UNITS: 100 INJECTION, SOLUTION INTRAVENOUS; SUBCUTANEOUS at 11:52

## 2018-08-01 RX ADMIN — INSULIN ASPART SCH UNITS: 100 INJECTION, SOLUTION INTRAVENOUS; SUBCUTANEOUS at 06:30

## 2018-08-01 RX ADMIN — PIPERACILLIN SODIUM, TAZOBACTAM SODIUM SCH MLS/HR: 4; .5 INJECTION, POWDER, LYOPHILIZED, FOR SOLUTION INTRAVENOUS at 02:24

## 2018-08-01 RX ADMIN — Medication SCH MG: at 08:21

## 2018-08-01 RX ADMIN — INSULIN GLARGINE SCH UNITS: 100 INJECTION, SOLUTION SUBCUTANEOUS at 11:53

## 2018-08-01 NOTE — CARDIOLOGY PROGRESS NOTE
Cardiology Progress Note


Date of Service


Aug 1, 2018.





Cardiology Progress Note


I spoke to patient's PCP , Dr Tabares by phone.


She was able to read to me the report of an MRA performed last week in 

[place]. 


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 

some degree of right peroneal artery disease also, no other significant 

obstruction.


In addition, the patient has had difficult to control diabetes.  Most recent 

hemoglobin A1c had been 8%.  He declined insulin in the past, and his 

medications have been limited by financial limitations.  I updated Dr. Tabares 

about the patient's workup at [facility] thus far.

## 2018-08-01 NOTE — PHARMACY PROGRESS NOTE
Glycemic Control Intl Consult


Date of Service


Aug 1, 2018.





Scope


Glycemic Pharmacist consulted by Dr Johnson on 7/31/18 for glycemic control 

and to write orders per [facility] inpatient glycemic control protocol





Objective


Weight (Kilograms):  122.500


Accuchecks BSG (last 24hrs):











Test


  7/31/18


14:29 7/31/18


21:29 8/1/18


00:17 8/1/18


06:07


 


Random Glucose


  226 mg/dl


(70-99) 


  


  


 


 


Bedside Glucose


  


  179 mg/dl


(70-99) 196 mg/dl


(70-99) 325 mg/dl


(70-99)








Laboratory Data (last 24hrs)











Test


  7/31/18


14:29 7/31/18


19:26 8/1/18


02:34


 


Anion Gap 12.0 mmol/L   


 


BUN/Creatinine Ratio 24.1   


 


Blood Urea Nitrogen 28 mg/dl   


 


Creatinine 1.16 mg/dl   1.22 mg/dl 


 


Potassium Level 3.9 mmol/L   


 


Sodium Level 130 mmol/L  130 mmol/L  133 mmol/L 


 


White Blood Count 17.51 K/uL   


 


Red Blood Count 5.91 M/uL   


 


Hemoglobin 17.7 g/dL   


 


Hematocrit 51.7 %   


 


Mean Corpuscular Volume 87.5 fL   


 


Mean Corpuscular Hemoglobin 29.9 pg   


 


Mean Corpuscular Hemoglobin


Concent 34.2 g/dl 


  


  


 


 


Platelet Count 140 K/uL   


 


Mean Platelet Volume 12.6 fL   


 


Neutrophils (%) (Auto) 89.9 %   


 


Lymphocytes (%) (Auto) 4.0 %   


 


Monocytes (%) (Auto) 5.1 %   


 


Eosinophils (%) (Auto) 0.3 %   


 


Basophils (%) (Auto) 0.2 %   


 


Neutrophils # (Auto) 15.75 K/uL   


 


Lymphocytes # (Auto) 0.70 K/uL   


 


Monocytes # (Auto) 0.90 K/uL   


 


Eosinophils # (Auto) 0.05 K/uL   


 


Basophils # (Auto) 0.03 K/uL   


 


Hemoglobin A1c   8.9 % 








HbA1c











Test


  8/1/18


02:34


 


Hemoglobin A1c


  8.9 %


(4.5-5.6)  H











Recent Pertinent Medications


Outpatient Anti-diabetic Regimen: 


* Invokamet 150/500 mg BID


* Glimepiride 4 mg daily


* Sitagliptin 100 mg daily








The patient is currently receiving:


* Basal insulin:              Lantus 25 units x 1 last night





* Correctional Insulin:     Novolog Correction per scale ACHS


                            Goal Range: Low 110 mg/dL - High 150 mg/dL


                            Correction Factor: 25 mg/dL/unit





* Prandial insulin:           Per carb ratio of 1 unit per 10 grams CHO consumed





* Oral Agents:               None at this time








Risk Factors for Insulin Resistance/Sensitivity:


* Infection: L leg cellulitis, r/o osteo


* IVF: D5NS + 20 K @ 60 cc/hr -> just switched to NS


* Diet: NPO





Assessment & Plan


ASSESSMENT:


* Mr. Cervantes is a 68 y/o male admitted with A fib w/ RVR.  He has type 2 

diabetes that is not well controlled as per the A1c.


* BSGs were just slightly above goal last night but up to 325 mg/dL this AM - 

suspect this is maybe in part due to the dextrose IVFs, which are now stopped.  

Will still need basal/bolus insulin due to requiring 3+ oral agents as an 

outpatient.


* Pt is maintained on oral antidiabetic agents as an outpatient


 * Oral agents are not recommended for inpatient use d/t drug interactions, 

changing PO intake, and difficulty titrating for acute hyper/hypoglycemia. ADA 

recommends re-initiating outpatient oral agents 1-2 days prior to discharge if/

when appropriate if they were held on admission. 


* Will hold oral agents for admission and utilize SQ basal bolus insulin 

regimen which is the recommended regimen for inpatient glycemic control.


 * Will initiate weight based insulin dosing for insulin kirstin patient and 

titrate based on BSG trends. 





PLAN FOR INPATIENT GLYCEMIC CONTROL:





* Continue to hold outpatient oral diabetes medications





* Basal insulin with LANTUS SQ BID as per the following scale:


 * 12 units for BSG less than 150


 * 22 units for  or above





* NOVOLOG per scale ACHS - tighten parameters


 * Goal Range:  Low 110 mg/dL - High 150 mg/dL


 * Correction Factor: 20 mg/dL/unit


 * Nutritional / Prandial insulin per carb ratio of 1 unit per 8 grams CHO 

consumed











Discharge Recommendations:


* Patient on 3+ oral agents without A1c at goal


* If tolerated, Invokamet dose could be increased (max is 150 mg/1000 mg BID).  

Another option would be to add a once daily dose of basal insulin.


* Further recommendations will be provided closer to discharge





Thank you.

## 2018-08-01 NOTE — DIAGNOSTIC IMAGING REPORT
KUB



HISTORY: Acute ileus with abdominal distention.  ileus, ff up



COMPARISON: CT abdomen and pelvis and acute abdominal series radiographs

7/31/2018



FINDINGS: The bowel gas pattern is non-obstructive. Air is seen within a normal

caliber colon. Cholecystectomy clips are noted. Vascular calcifications of the

pelvis. There is no organomegaly.  No renal calculi. No ureteral calculi. No

pneumoperitoneum or pneumatosis. No fracture.



IMPRESSION:  

Nonobstructive bowel gas pattern without pneumoperitoneum.







Electronically signed by:  Prasanth Marquez M.D.

8/1/2018 1:22 PM



Dictated Date/Time:  8/1/2018 1:20 PM

## 2018-08-01 NOTE — ECHOCARDIOGRAM REPORT
*NOTICE TO RECEIVING PARTY AGENCY**  This information is strictly Confidential and 
protected under [place] law.  [place] law prohibits you from making any further 
disclosure of this information unless further disclosure is expressly permitted by the 
written consent of the person to whom it pertains or is authorized by law.  A general 
authorization for the release of medical or other information is not sufficient for this 
purpose.  Hospital accepts no responsibility if the information is made available to any 
other person, INCLUDING THE PATIENT.



Interpretation Summary

  *  Name: MILAD AIKEN  Study Date: 2018 06:41 AM  BP: 111/68 mmHg

  *  MRN: [id number]  Patient Location: S230  HR: 82

  *  : 1950 (M/d/yyyy)  Gender: Male  Height: 70 in

  *  Age: 67 yrs  Ethnicity: [place]  Weight: 275 lb

  *  Ordering Physician: Jose Johnson

  *  Referring Physician: Self, Referred

  *  Performed By: Queta Joseph RCS

  *  Accession# [id number]  Account# [id number]

  *  Reason For Study: A-FIB WITH RVR

  *  BSA: 2.4 m2

  *  -- Conclusions --

  *  The study was technically adequate for the referral indication.

  *  Atrial fibrillation with controlled ventricular rate in the range of 85-90 bpm was 
present during the echocardiogram.

  *  There is moderate concentric left ventricular hypertrophy.

  *  There is borderline global hypokinesis of the left ventricle.

  *  Left ventricular systolic function is low normal.

  *  The left ventricular ejection Fraction = 50-55%.

  *  Right ventricular chamber size and systolic function are grossly normal on 
technically limited imaging.

  *  The aortic valve is severely calcified.

  *  Images are insufficient to distinguish if this is a trileaflet aortic valve or 
bicuspid morphology,

  *  Critically severe aortic valve stenosis is present.

  *  The peak continuous wave Doppler velocity across aortic valve is 5 m/s.

  *  The mean calculated gradient across aortic valve is 53 mmHg.

  *  The aortic valve area is calculated by the continuity equation is 0.4 cm2

  *  Trace aortic regurgitation.

  *  There is mild mitral annular calcification.

  *  There is mild mitral regurgitation.

  *  Doppler findings do not suggest pulmonary hypertension.

  *  Aortic root diameter is normal.

  *  The proximal ascending thoracic aorta is not visualized well enough to allow 
measurement.

Procedure Details

  *  A complete two-dimensional transthoracic echocardiogram was performed (2D, M-mode, 
Doppler and color flow Doppler).

  *  The study was technically difficult.

  *  There were technical limitations due to patient'spoor positioning

  *  A contrast injection of Definity was performed to improve assessment of LV function.

  *  Contrast was injected into an intravenous site in the right arm.

  *  One vial of Definity ultrasound contrast was diluted in normal saline to a total 
volume of 10 ml.  A total of '2' ml of solution was administered during imaging.

  *  Lot # [id number] of Definity utilized for procedure.

  *  Expiration date .

  *  The attending nurse who injected the contrast agent was ARMANDO ARREAGA, RN.

Left Ventricle

  *  The left ventricle is normal in size.

  *  There is moderate concentric left ventricular hypertrophy.

  *  Left ventricular systolic function is low normal.

  *  Ejection Fraction = 50-55%.

  *  There is borderline global hypokinesis of the left ventricle.

Right Ventricle

  *  Right ventricular chamber size and systolic function are grossly normal on 
technically limited imaging.

Atria

  *  The left atrium is severely dilated.

  *  Right atrial size is normal.

  *  There is no evidence of atrial septal defect, but resolution does not allow 
assessment for a patent foramen ovale.

Mitral Valve

  *  There is mild mitral annular calcification.

  *  There is no mitral valve stenosis.

  *  There is mild mitral regurgitation.

Tricuspid Valve

  *  The tricuspid valve is normal.

  *  There is no tricuspid stenosis.

  *  Significant tricuspid regurgitation is absent.

  *  Doppler findings do not suggest pulmonary hypertension.

Aortic Valve

  *  The aortic valve is severely calcified. Images are insufficient to distinguish if 
this is a trileaflet aortic valve or bicuspid morphology,

  *  Critically severe aortic valve stenosis is present.

  *  Trace aortic regurgitation.

Pulmonic Valve

  *  The pulmonary valve is not well seen, but the Doppler examination is normal without 
significant regurgitation or stenosis.

Great Vessels

  *  Aortic root diameter is normal. The proximal ascending thoracic aorta is not 
visualized well enough to allow measurement.

Pericardium/Pleural

  *  There is no pericardial effusion.

Great Vessels

  *  Normal inferior vena cava diameter and respiratory variation suggests normal central 
venous pressure.



MMode 2D Measurements and Calculations

IVSd 1.7 cm

IVSs 1.8 cm



LVIDd 4.5 cm

LVIDs 4.1 cm

LVPWd 1.9 cm

LVPWs 2.2 cm



IVS/LVPW 0.91 

FS 9.9 %

EDV(Teich) 94.2 ml

ESV(Teich) 73.7 ml

EF(Teich) 21.7 %



EDV(cubed) 93.3 ml

ESV(cubed) 68.3 ml

EF(cubed) 26.8 %

% IVS thick 2.1 %

% LVPW thick 15.8 %





LV mass(C)d 379.2 grams

LV mass(C)dI 158.7 grams/m\S\2

LV mass(C)s 381.9 grams

LV mass(C)sI 159.8 grams/m\S\2



SV(Teich) 20.5 ml

SI(Teich) 8.6 ml/m\S\2

SV(cubed) 25.0 ml

SI(cubed) 10.5 ml/m\S\2



Ao root diam 2.9 cm

Ao root area 6.8 cm\S\2

LA dimension 4.9 cm



LA/Ao 1.7 

LVOT diam 2.0 cm

LVOT area 3.3 cm\S\2





LVAd ap4 44.4 cm\S\2

LVLd ap4 9.4 cm

EDV(MOD-sp4) 168.6 ml

EDV(sp4-el) 177.9 ml

LVAs ap4 36.3 cm\S\2

LVLs ap4 8.5 cm

ESV(MOD-sp4) 128.1 ml

ESV(sp4-el) 131.9 ml

EF(MOD-sp4) 24.0 %

EF(sp4-el) 25.8 %



LVAd ap2 39.9 cm\S\2

LVLd ap2 9.0 cm

EDV(MOD-sp2) 145.0 ml

EDV(sp2-el) 150.4 ml

LVAs ap2 30.2 cm\S\2

LVLs ap2 8.4 cm

ESV(MOD-sp2) 89.2 ml

ESV(sp2-el) 92.2 ml

EF(MOD-sp2) 38.4 %

EF(sp2-el) 38.7 %



LVLd %diff -4.44 %

EDV(MOD-bp) 158.9 ml

LVLs %diff -0.84 %

ESV(MOD-bp) 107.0 ml

EF(MOD-bp) 32.7 %



SV(MOD-sp4) 40.4 ml

SI(MOD-sp4) 16.9 ml/m\S\2





SV(MOD-sp2) 55.7 ml

SI(MOD-sp2) 23.3 ml/m\S\2



SV(MOD-bp) 51.9 ml

SI(MOD-bp) 21.7 ml/m\S\2



SV(sp4-el) 45.9 ml

SI(sp4-el) 19.2 ml/m\S\2



SV(sp2-el) 58.2 ml

SI(sp2-el) 24.4 ml/m\S\2







Doppler Measurements and Calculations

MV E max maria esther 126.0 cm/sec



MV P1/2t max maria esther 145.5 cm/sec

MV P1/2t 83.9 msec

MVA(P1/2t) 2.6 cm\S\2

MV dec slope 507.7 cm/sec\S\2

MV dec time 0.25 sec



Ao V2 max 494.1 cm/sec

Ao max PG 97.8 mmHg

Ao max PG (full) 96.3 mmHg

Ao V2 mean 417.3 cm/sec

Ao mean PG 75.1 mmHg

Ao mean PG (full) 74.3 mmHg

Ao V2 .5 cm

KOKI(I,A) 0.31 cm\S\2

KOKI(I,D) 0.31 cm\S\2

KOKI(V,A) 0.39 cm\S\2

KOKI(V,D) 0.39 cm\S\2



LV V1 max PG 1.4 mmHg

LV V1 mean PG 0.82 mmHg





LV V1 max 59.4 cm/sec

LV V1 mean 42.7 cm/sec

LV V1 VTI 12.3 cm



SV(Ao) 861.3 ml

SI(Ao) 360.4 ml/m\S\2

SV(LVOT) 40.1 ml

SI(LVOT) 16.8 ml/m\S\2

## 2018-08-01 NOTE — PROGRESS NOTE
Progress Note


Date of Service


Aug 1, 2018.





Progress Note


ID Consult Dictated #[id number]





A/P:





1. LE ulcers





-Continue doxy for now, await blood cultures, suspect vascular cause


-Thank you

## 2018-08-01 NOTE — INFECT. DISEASE CONSULTATION
DATE OF CONSULTATION:  08/01/2018

 

HISTORY OF PRESENT ILLNESS:  This is a 67-year-old gentleman who was admitted

from a diagnostic center after he was found to be in AFib with rapid

ventricular response.  He was having outpatient arterial ultrasounds done on

his lower extremities to rule out peripheral arterial disease secondary to

chronic bilateral leg wounds and he developed nausea.  He then was found to

be in AFib and sent to the hospital.  He was started on a Cardizem drip and

is being followed by cardiology.  He states that he has had prolonged

ulcerations of his lower extremity.  He has had no definitive treatment for

this.  He recently completed a 10-day course of unknown antibiotics from his

primary physician for suspected cellulitis.  He did not notice an appreciable

difference in his ulcerations.  He states that the ulcerations are chronic. 

They are not draining.  He does admit to chronic discoloration of his lower

extremities.  He states that his legs are feeling better today, but he

attributes this to being flat in bed and not up and ambulating on his lower

extremities.  He does admit to chronic lower extremity edema as well.  He

denies any fevers or chills.  He was initially given vancomycin and Zosyn and

then changed to oral doxycycline.  Infectious diseases was asked to see the

patient in consultation for transition to oral antibiotics.  He has been

afebrile and hemodynamically stable since admission to the hospital.  He

denies any chest pain, cough, shortness of breath, nausea, vomiting, or

diarrhea.

 

REVIEW OF SYSTEMS:  His remaining review of systems is reviewed and

unremarkable.

 

PAST MEDICAL HISTORY:  Significant for AFib, type 2 diabetes, moderate aortic

stenosis, hypertension.

 

PAST SURGICAL HISTORY:  Unremarkable.

 

SOCIAL HISTORY:  Significant for history of tobacco use.  He denies any

alcohol or drug use.

 

FAMILY HISTORY:  Noncontributory.

 

ALLERGIES:  There are no known drug allergies.

 

MEDICATIONS:  Doxycycline, Lantus, allopurinol, Synthroid, Wellbutrin,

Pradaxa, gemfibrozil, magnesium, Lopressor, Zofran, Tylenol.

 

PHYSICAL EXAMINATION:

VITAL SIGNS:  He is afebrile, pulse 86, respiratory rate 16, blood pressure

106/66, oxygen saturation is 96% on room air.

GENERAL:  He is awake, alert and oriented x3.  He is in no acute distress.

HEENT:  Mucous membranes are moist.  Extraocular muscles are intact.

HEART:  Without murmur.

LUNGS:  Clear bilaterally.

ABDOMEN:  Soft, nondistended.  There is no edema bilaterally.  There is

chronic superficial ulcerations bilaterally.  There is no purulent drainage

or bleeding.  There is no warmth or tenderness.  There is chronic

discoloration.

 

LABORATORY STUDIES:  CBC:  White blood cell count yesterday 17.5, hemoglobin

17.7, platelets 140.  Chemistry panel:  Sodium 133, potassium 3.9, chloride

97, bicarbonate 21, BUN 28, creatinine 1.1, glucose has been between 150 and

325.  LFTs are within normal limits.  UA was negative.  Blood cultures are

pending.  Lower extremity ultrasounds showed high-grade stenosis.

 

ASSESSMENT AND PLAN:  Lower extremity ulcerations.  I suspect that these are

related more to vascular issue rather than infectious.  He is currently on

doxycycline and can continue this pending blood culture results.  We will

follow along with you.

 

Thank you for this consultation.

## 2018-08-01 NOTE — PROGRESS NOTE
Subjective


Date of Service:


Aug 1, 2018.


Subjective


Pt evaluation today including:  conversation w/ patient, physical exam, lab 

review, review of studies, review of inpatient medication list


Saw/examined the patient in room 230-2


He is doing better today


Has LLE pain, warmth to touch, erythema


States he has some shortness of breath, dyspnea with exertion and at times at 

rest


Denies chest pain/palpitations


Denies abdominal pain





Problem List


Medical Problems:


(1) Atrial fibrillation with rapid ventricular response


Status: Acute  





(2) Ileus


Status: Acute  





(3) Nausea


Status: Acute  











Review of Systems


Constitutional:  No fever, No chills


Respiratory:  + shortness of breath, + dyspnea on exertion, + dyspnea at rest, 

No cough, No sputum, No wheezing, No hemoptysis


Cardiac:  + edema, No chest pain, No palpitations


Abdomen:  No pain, No nausea, No vomiting, No diarrhea, No constipation, No GI 

bleeding


Psychiatric:  No depression symptoms, No anxiety, No insomnia


Skin:  + new/changing skin lesions





Medications





Current Inpatient Medications








 Medications


  (Trade)  Dose


 Ordered  Sig/Bella


 Route  Start Time


 Stop Time Status Last Admin


Dose Admin


 


 Ioversol


  (Optiray 320)  125 ml  UD  PRN


 IV  7/31/18 17:15


 8/4/18 17:14   


 


 


 Acetaminophen


  (Tylenol Tab)  650 mg  Q4H  PRN


 PO  7/31/18 18:30


 8/30/18 18:29   


 


 


 Ondansetron HCl


  (Zofran Inj)  4 mg  Q6H  PRN


 IV  7/31/18 18:30


 8/30/18 18:29   


 


 


 Miscellaneous


 Information


  (Consult


 Glycemic


 Management


 Pharmacy)  1 ea  UD  PRN


 N/A  7/31/18 19:06


 8/30/18 19:05   


 


 


 Glucose


  (Glucose 40% Gel)  15-30


 GRAMS 15


 GRAMS...  UD  PRN


 PO  7/31/18 18:30


 8/30/18 18:29   


 


 


 Glucose


  (Glucose Chew


 Tab)  4-8


 Tablets 4


 Tabl...  UD  PRN


 PO  7/31/18 18:30


 8/30/18 18:29   


 


 


 Dextrose


  (Dextrose 50%


 50ML Syringe)  25-50ML


 25ML FOR


 ...  UD  PRN


 IV  7/31/18 18:30


 8/30/18 18:29   


 


 


 Glucagon


  (Glucagon Inj)  1 mg  UD  PRN


 SQ  7/31/18 18:30


 8/30/18 18:29   


 


 


 Carbohydrates


  (Carbohydrates


 For Hypoglycemia)  15-30 GRAMS


 15 grams if


 BSG 54-69...  UD  PRN


 PO  7/31/18 18:30


 8/30/18 18:29   


 


 


 Allopurinol


  (Zyloprim Tab)  100 mg  DAILY


 PO  8/1/18 09:00


 8/31/18 08:59  8/1/18 08:20


100 MG


 


 Bupropion HCl


  (Wellbutrin-Sr


 Tab)  150 mg  BID


 PO  7/31/18 21:00


 8/30/18 20:59  8/1/18 08:20


150 MG


 


 Dabigatran


  (Pradaxa Cap)  150 mg  BID


 PO  7/31/18 21:00


 8/30/18 20:59  8/1/18 08:21


150 MG


 


 Gemfibrozil


  (Lopid Tab)  600 mg  BID


 PO  7/31/18 21:00


 8/30/18 20:59  8/1/18 08:20


600 MG


 


 Levothyroxine


 Sodium


  (Synthroid Tab)  50 mcg  DAILYBB


 PO  8/1/18 06:00


 8/31/18 05:59  8/1/18 05:52


50 MCG


 


 Magnesium Oxide


  (Mag-Ox Tab)  400 mg  BID


 PO  7/31/18 21:00


 8/30/18 20:59  8/1/18 08:21


400 MG


 


 Metoprolol


 Tartrate


  (Lopressor Tab)  25 mg  BID


 PO  7/31/18 21:00


 8/30/18 20:59  8/1/18 08:21


25 MG


 


 Insulin Glargine


  (Lantus Solostar


 Pen)  see


 protocol


 text  BID


 SC  8/1/18 09:45


 8/31/18 09:44  8/1/18 11:53


22 UNITS


 


 Doxycycline


 Hyclate


  (Vibramycin Cap)  100 mg  BID


 PO  8/1/18 14:00


 8/3/18 13:59  8/1/18 14:22


100 MG


 


 Insulin Aspart


  (novoLOG ASPART)  **SLIDING


 SCALE**


 **If C...  ACHS


 SQ  8/1/18 17:15


 8/31/18 17:14   


 











Objective


Vital Signs











  Date Time  Temp Pulse Resp B/P (MAP) Pulse Ox O2 Delivery O2 Flow Rate FiO2


 


8/1/18 15:39 36.9 78 19 104/69 (81) 96 Room Air  


 


8/1/18 12:01 37.1 86 16 84/49 (61) 97 Room Air  


 


8/1/18 09:02 37.0 83 16 106/66 (79) 96 Room Air  


 


8/1/18 08:00      Room Air  


 


8/1/18 03:35 37.1 92 20 111/68 (82) 94 Room Air  


 


8/1/18 00:01      Room Air  


 


7/31/18 23:35 37.2 100 20 104/71 (82) 93 Room Air  


 


7/31/18 21:43  102  110/73 (85)    


 


7/31/18 20:05      Room Air  


 


7/31/18 19:24 37.4 112 22 120/75 (90) 92   


 


7/31/18 18:27 37.1 116 20 113/70  Room Air  











Physical Exam


General Appearance:  no apparent distress


Respiratory/Chest:  no respiratory distress, no accessory muscle use, + 

decreased breath sounds


Cardiovascular:  regular rate, rhythm, no edema, + systolic murmur


Extremities:  + pertinent finding (warmth in the left lower extremity, venous 

stasis discoloration)


Neurologic/Psychiatric:  no motor/sensory deficits, alert, normal mood/affect





Laboratory Results





Last 24 Hours








Test


  7/31/18


19:26 7/31/18


21:29 8/1/18


00:17 8/1/18


02:34


 


Sodium Level 130 mmol/L    133 mmol/L 


 


Troponin I 0.089 ng/ml    0.099 ng/ml 


 


Bedside Glucose  179 mg/dl  196 mg/dl  


 


Creatinine    1.22 mg/dl 


 


Est Creatinine Clear Calc


Drug Dose 


  


  


  74.0 ml/min 


 


 


Estimated GFR (


American) 


  


  


  70.2 


 


 


Estimated GFR (Non-


American 


  


  


  60.5 


 


 


Estimated Average Glucose    209 mg/dl 


 


Hemoglobin A1c    8.9 % 


 


Test


  8/1/18


06:07 8/1/18


11:21 8/1/18


17:06 


 


 


Bedside Glucose 325 mg/dl  157 mg/dl  130 mg/dl  











Assessment and Plan


This is a 67 year old male with a past medical history of DM2, HTN, HLD, 

depression/anxiety, paroxysmal atrial fibrillation on long-term anticoagulation

, hypothyroidism - presents with nausea and shortness of breath





Likely Chronic Atrial Fibrillation with RVR


- patient with a hx. of atrial fibrillation, presented with rapid ventricular 

response


- initially started on a Cardizem drip; now back on metoprolol tartrate


- continue Pradaxa for anticoagulation


- appreciate cardiology input





Newly Diagnosed Critically Severe Aortic Stenosis


- holding IVFs, changing IV abx. to PO to prevent overload


- EF of around 50-55%


- will need outpatient aortic valve work-up; likely surgical repair at tertiary 

care center





Peripheral Vascular Disease


LLE Wound Ulcerations


- arterial disease noted; L peroneal artery stenosis


- Dr. Ramírez consulted for vascular management


- once optimized, can d/c home


- changed IV abx. to Doxycycline


- ID consulted for further input, length of treatment, etc.





Mild Ileus


- spoke with GI, this likely does not represent ileus, though read as such on CT


- will give clears, stop IVFs, and advance diet as tolerated





DM2


- ha1c of 8.9%, not well controlled


- currently on 4 oral agents as outpatient


- will stop oral agents and insulin sliding scale with Lantus


- appreciate glycemic control consultation





Hypothyroidism


- continue Synthroid





DVT ppx


- Pradaxa





FULL CODE

## 2018-08-01 NOTE — CARDIOLOGY CONSULTATION
Cardiology Consultation


Date of Consultation:  Aug 1, 2018


History of Present Illness


Jose David Cervantes a 67 year old male seen in cardiology consultation per 

the request of Dr. Johnson for the evaluation of atrial fibrillation and 

aortic valve stenosis.  This is the patient's first encounter at our facility.  

He is apparently been seen in the past by cardiology at [facility] but 

he describes that he does not follow routinely with cardiology.  The patient 

describes over the last few weeks he has had progressive worsening bilateral 

lower extremity swelling with associated worsening ulcerations of his left 

lower leg.  He had seen his primary care provider, Dr. Tabares on 7/22/18 

describes being placed on furosemide 40 mg daily in addition to his losartan/

hydrochlorothiazide with interval loss of 6 pounds and mild improvement in his 

bilateral lower extremity edema.  He has been counseled that he has a problem 

with the circulation in his lower legs he states specifically his left peroneal 

artery.  He describes having had a past ultrasound in [place] and it sounds 

that about a week ago, he had a CT scan or an MRI performed at [place].  It 

is not certain what the imaging study was for her which modality was utilized.  

He does however describe having "difficulty with the contrast ".





Yesterday he states that he was in [place] at [facility] vein clinic for further 

evaluation of his leg circulation.  As he was leaving after his appointment 

there he developed abrupt onset of nausea and describes nearly passing out.  He 

describes that his symptoms came on suddenly having felt well earlier that day.

  The staff at the vein clinic became concerned and called EMS.  Patient was 

found to have atrial fibrillation with rapid ventricular response, 124 bpm on 

his initial EKG as well as several PVCs.  EKG performed on arrival to the 

emergency room yesterday also revealed an age-indeterminate septal infarction 

pattern and age-indeterminate lateral infarction pattern with poor R-wave 

progression in the precordial leads V1 to V5 with associated J-point elevation.

  The patient was placed on a diltiazem infusion which is since been 

discontinued telemetry reveals current atrial fibrillation in the range of 85-

90 bpm.





The patient is sitting in bed and feeling well without any chest discomfort, 

shortness of breath, and otherwise feels well without acute complaint at rest 

at present.  Due to his chief complaint of nauseousness and CT of the abdomen 

and pelvis had been performed which revealed a few loops of nondilated fluid-

filled small bowel perhaps reflective of an ileus or enteritis.





History


Past Medical History: 


1.  Long-standing history of type 2 diabetes mellitus


2.  Hypertension


3.  Dyslipidemia


4.  Atrial fibrillation the patient describes being seen by cardiology at 

[place] for AF many years ago.  He describes having had attempted cardioversion 

2, and per his recollection it sounds as though he is in a chronic rate 

controlled atrial fibrillation rate atrial fibrillation in the mid 80 B per 

minute range as documented on an outside EKG performed in [place] in August, 2014


5.  Moderate to severe aortic valve stenosis on echocardiogram, [place], August 2014 (progressed to critically severe aortic valve stenosis as of 

echocardiogram performed at the [facility] 8/1/18 as 

described below)


6.  Normal coronary arteries by cardiac catheterization [place] August, 2014





Past Surgical History: 


Unavailable





Social History:  


Patient is single, lives alone, is a former smoker.





Family History: 


Noncontributory





Review Of Systems


A 10 point review of systems is reviewed and is negative with the exception of 

that above





Allergies


Coded Allergies:  


     No Known Allergies (Unverified , 7/31/18)





Medications





Reported Home Medications








 Medications  Dose


 Route/Sig


 Max Daily Dose Days Date Category Dose


Instructions


 


 Lasix


  (Furosemide) 40


 Mg Tab  40 Mg


 PO DAILY


    7/31/18 Reported 


 


 Mag-Ox (Magnesium


 Oxide) 400 Mg Tab  400 Mg


 PO BID


    7/31/18 Reported 


 


 Glimepiride 4 Mg


 Tab  4 Mg


 PO DAILY


   90 7/31/18 Reported 


 


 Multivitamin


  (Multivitamins)


 Tab  1 Tab


 PO DAILY


    7/31/18 Reported 


 


 Invokamet 150-500


 mg


  (Canagliflozin-Metformin


 HCl) 1 Tab Tab  1 Tab


 PO BID


    7/31/18 Reported 


 


 Indocin


  (Indomethacin) 50


 Mg Cap  50 Mg


 PO PRN PRN


    7/31/18 Reported  WITH FOOD UNTIL PAIN RESOLVES


 


 Zyloprim


  (Allopurinol) 100


 Mg Tab  100 Mg


 PO DAILY


    7/31/18 Reported 


 


 Pradaxa


  (Dabigatran


 Etexilate


 Mesylate) 150 Mg


 Cap  150 Mg


 PO BID


    7/31/18 Reported 


 


 Hyzaar


 12.5MG/50MG


  (HCTZ/Losartan


 Potassium)  Tab  1 Tab


 PO DAILY


    7/31/18 Reported 


 


 Levothyroxine


 Sodium 50 Mcg Tab  50 Mcg


 PO DAILY


   90 7/31/18 Reported 


 


 Lopressor


  (Metoprolol


 Tartrate) 25 Mg


 Tab  25 Mg


 PO BID


    7/31/18 Reported 


 


 Wellbutrin Sr


  (Bupropion HCl)


 150 Mg Ertab  150 Mg


 PO BID


    7/31/18 Reported 


 


 Januvia


  (Sitagliptin


 Phosphate) 100 Mg


 Tab  100 Mg


 PO DAILY


    7/31/18 Reported 


 


 Lopid


  (Gemfibrozil) 600


 Mg Tab  600 Mg


 PO BID


    7/31/18 Reported 











Physical Exam


Vital Signs (Last 8hrs):





Last 8 Hrs








  Date Time  Temp Pulse Resp B/P (MAP) Pulse Ox O2 Delivery O2 Flow Rate FiO2


 


8/1/18 09:02 37.0 83 16 106/66 (79) 96 Room Air  


 


8/1/18 08:00      Room Air  


 


8/1/18 03:35 37.1 92 20 111/68 (82) 94 Room Air  








General Appearance: Alert and Oriented x3. NAD. 


Head: Normocephalic Atraumatic. 


Eyes: PERRLA, EOMI, conjunctiva and sclera clear


Neck:  Supple. No carotid bruits noted. No JVD. No HJD. 


Respiratory: Breath sounds clear to auscultation bilaterally. No w/r/r. 


Cardiovascular: Reg rate and rhythm. S1 and S2 noted. No murmurs, rubs, 

gallops. PMI non displace. 


Abdomen: Normal bowel sounds, soft nontender. no abdominal bruits. 


Extremities: No edema, no clubbing or cyanosis. distal pulses 2/4 bilaterally. 


Neuro:  No focal deficits. 


Psychiatric: Normal affect.





Data


Last Resulted


7/31/18 14:29








Red Blood Count 5.91, Mean Corpuscular Volume 87.5, Mean Corpuscular Hemoglobin 

29.9, Mean Corpuscular Hemoglobin Concent 34.2, Mean Platelet Volume 12.6, 

Neutrophils (%) (Auto) 89.9, Lymphocytes (%) (Auto) 4.0, Monocytes (%) (Auto) 

5.1, Eosinophils (%) (Auto) 0.3, Basophils (%) (Auto) 0.2, Neutrophils # (Auto) 

15.75, Lymphocytes # (Auto) 0.70, Monocytes # (Auto) 0.90, Eosinophils # (Auto) 

0.05, Basophils # (Auto) 0.03





Last Resulted


7/31/18 14:29








8/1/18 02:34











Past 24 Hours








Test


  7/31/18


14:29 7/31/18


19:26 8/1/18


02:34 Range/Units


 


 


Prothromb Time International


Ratio 1.4 H


  


  


  0.9-1.1  


 


 


Prothrombin Time


  14.2 H


  


  


  9.0-12.0


SECONDS


 


Troponin I 0.078 *H 0.089 *H 0.099 *H 0-0.045  ng/ml








EKG on arrival as outlined above, repeat on 7/31/18 at 1836 revealed atrial 

fibrillation 118 bpm with poor R-wave progression noted throughout the anterior 

precordial leads suggestive age-indeterminate anterolateral infarction.





Transthoracic echocardiogram performed today 8/1/18 reviewed independently by 

the undersigned:


Atrial fibrillation with controlled ventricular rate in the range of 85-90 bpm 

was present during echocardiogram


Moderate concentric left ventricular hypertrophy is present.


There is borderline global left ventricular hypokinesis present, left 

ventricular ejection fraction 50-55%.


Aortic valve is severely calcified.  The images are insufficient to distinguish 

at this is a trileaflet aortic valve or bicuspid morphology


Critically severe aortic valve stenosis is present, the peak continues with 

Doppler velocity across aortic valve is 5 m/s, the mean calculated gradient 

across aortic valve 53 mmHg, the calculated aortic valve area is 0.4 cm.


Trace aortic valve regurgitation is present.


Mild mitral regurgitation is present.


Doppler findings do not suggest pulmonary hypertension


Aortic root diameter is normal, the proximal ascending thoracic aorta is not 

visualized well enough to allow measurement.





Bilateral lower extremity ultrasound: 


Per summary radiology report, suggestion of distal peripheral arterial disease 

bilateral peroneal artery disease suggested





Assessment & Plan


Impression:


67-year-old male


1.  Patient presented with transient nauseousness, apparent near syncope, has 

been found with perhaps a mild ileus, also was noted to have atrial 

fibrillation with rapid ventricular response


2.  Apparent chronic typically rate controlled atrial fibrillation, diltiazem 

infusion has been weaned, and currently he is rate controlled back to his 

typical baseline per available information.  Per patient description it sounds 

as though he is in chronic atrial fibrillation with rate control including 

metoprolol tartrate 25 mg twice daily and anticoagulation with Pradaxa 150 mg 

twice daily


3.  Newly diagnosed critically severe aortic valve stenosis, low normal LVEF


4.  History of cardiac catheterization performed in [place] in 2014 for 

diagnosis of chest pain, report describes normal coronary arteries, moderate 

aortic valve stenosis at that time


5.  Type 2 diabetes mellitus, with recent lower leg edema, left lower extremity 

ulcers, bilateral distal lower extremity peripheral arterial disease, peroneal 

arteries


6.  Recent CT versus MRI performed in [place] approximately a week ago, 

details unknown at present, patient describes having had a "problem "with 

contrast, details unknown





Discussion/recommendations:


I contact the patient's primary care provider's office to facilitate getting 

the report of the study that he had last week.  My hope is that someone can 

read the report to me to determine what study was performed, with the findings 

were, and what kind of contrast and will kind of reaction may have taken place 

which of course would be helpful in his current management.





In terms of his atrial fibrillation.  Although his troponin is mildly elevated, 

I think this is consistent with him having had severe aortic valve stenosis in 

the setting of tachycardia, and is not representative of an acute coronary 

syndrome.  His findings of an age-indeterminate anterolateral infarction.





We will transition him back to his home dose of metoprolol.





Continue to hold his diet for possible ileus for now.





He is on IV antibiotics with Zosyn, and I would caution that we need to be very 

cautious with his IV fluid intake given his critically severe aortic valve 

stenosis.  He is currently on vancomycin and Zosyn.





My impression, is that we need to address his lower extremity circulation, 

perhaps a consultation with endovascular medicine this admission.  We need to 

optimize his lower extremity ulcerations and promote healing perhaps with 

endovascular revascularization as he is not a candidate for open 

revascularization at present.  Recommend that his ulcers need to be optimized 

before proceeding with future cardiac catheterization and workup toward a 

surgical aortic valve replacement.





I think her goal for this hospital stay is to optimize his nauseousness, 

perhaps address his lower extremity arterial insufficiency, and preparation for 

further outpatient aortic valve workup.  He will need a repeat cardiac 

catheterization to exclude coronary artery disease prior to CT surgery 

consultation as an outpatient.  Patient stated that he was interested in 

pursuing aortic valve replacement at [facility].

## 2018-08-02 VITALS
SYSTOLIC BLOOD PRESSURE: 111 MMHG | HEART RATE: 118 BPM | DIASTOLIC BLOOD PRESSURE: 66 MMHG | OXYGEN SATURATION: 96 % | TEMPERATURE: 97.88 F

## 2018-08-02 VITALS
OXYGEN SATURATION: 93 % | TEMPERATURE: 98.06 F | HEART RATE: 69 BPM | DIASTOLIC BLOOD PRESSURE: 78 MMHG | SYSTOLIC BLOOD PRESSURE: 110 MMHG

## 2018-08-02 VITALS
OXYGEN SATURATION: 93 % | SYSTOLIC BLOOD PRESSURE: 138 MMHG | TEMPERATURE: 98.24 F | HEART RATE: 128 BPM | DIASTOLIC BLOOD PRESSURE: 84 MMHG

## 2018-08-02 VITALS
OXYGEN SATURATION: 95 % | HEART RATE: 113 BPM | DIASTOLIC BLOOD PRESSURE: 76 MMHG | TEMPERATURE: 98.78 F | SYSTOLIC BLOOD PRESSURE: 117 MMHG

## 2018-08-02 VITALS — OXYGEN SATURATION: 96 %

## 2018-08-02 VITALS
TEMPERATURE: 97.7 F | SYSTOLIC BLOOD PRESSURE: 132 MMHG | HEART RATE: 117 BPM | DIASTOLIC BLOOD PRESSURE: 87 MMHG | OXYGEN SATURATION: 96 %

## 2018-08-02 VITALS
HEART RATE: 109 BPM | DIASTOLIC BLOOD PRESSURE: 74 MMHG | OXYGEN SATURATION: 97 % | TEMPERATURE: 98.78 F | SYSTOLIC BLOOD PRESSURE: 112 MMHG

## 2018-08-02 VITALS — HEART RATE: 136 BPM | OXYGEN SATURATION: 96 % | DIASTOLIC BLOOD PRESSURE: 77 MMHG | SYSTOLIC BLOOD PRESSURE: 141 MMHG

## 2018-08-02 LAB
BUN SERPL-MCNC: 21 MG/DL (ref 7–18)
CALCIUM SERPL-MCNC: 8.5 MG/DL (ref 8.5–10.1)
CO2 SERPL-SCNC: 27 MMOL/L (ref 21–32)
CREAT SERPL-MCNC: 0.89 MG/DL (ref 0.6–1.4)
EOSINOPHIL NFR BLD AUTO: 120 K/UL (ref 130–400)
GLUCOSE SERPL-MCNC: 102 MG/DL (ref 70–99)
HCT VFR BLD CALC: 47 % (ref 42–52)
HGB BLD-MCNC: 15.6 G/DL (ref 14–18)
MCH RBC QN AUTO: 29.2 PG (ref 25–34)
MCHC RBC AUTO-ENTMCNC: 33.2 G/DL (ref 32–36)
MCV RBC AUTO: 88 FL (ref 80–100)
PMV BLD AUTO: 12.4 FL (ref 7.4–10.4)
POTASSIUM SERPL-SCNC: 3.6 MMOL/L (ref 3.5–5.1)
RED CELL DISTRIBUTION WIDTH CV: 16 % (ref 11.5–14.5)
RED CELL DISTRIBUTION WIDTH SD: 51.5 FL (ref 36.4–46.3)
SODIUM SERPL-SCNC: 134 MMOL/L (ref 136–145)
WBC # BLD AUTO: 12.53 K/UL (ref 4.8–10.8)

## 2018-08-02 RX ADMIN — INSULIN ASPART SCH UNITS: 100 INJECTION, SOLUTION INTRAVENOUS; SUBCUTANEOUS at 12:05

## 2018-08-02 RX ADMIN — BUPROPION HYDROCHLORIDE SCH MG: 150 TABLET, EXTENDED RELEASE ORAL at 08:23

## 2018-08-02 RX ADMIN — DABIGATRAN ETEXILATE MESYLATE SCH MG: 75 CAPSULE ORAL at 21:34

## 2018-08-02 RX ADMIN — INSULIN GLARGINE SCH UNITS: 100 INJECTION, SOLUTION SUBCUTANEOUS at 21:41

## 2018-08-02 RX ADMIN — GEMFIBROZIL SCH MG: 600 TABLET ORAL at 21:33

## 2018-08-02 RX ADMIN — INSULIN ASPART SCH UNITS: 100 INJECTION, SOLUTION INTRAVENOUS; SUBCUTANEOUS at 17:21

## 2018-08-02 RX ADMIN — GEMFIBROZIL SCH MG: 600 TABLET ORAL at 08:23

## 2018-08-02 RX ADMIN — Medication SCH MG: at 08:23

## 2018-08-02 RX ADMIN — Medication SCH MG: at 21:33

## 2018-08-02 RX ADMIN — BUPROPION HYDROCHLORIDE SCH MG: 150 TABLET, EXTENDED RELEASE ORAL at 21:30

## 2018-08-02 RX ADMIN — DOXYCYCLINE HYCLATE SCH MG: 100 CAPSULE, GELATIN COATED ORAL at 21:36

## 2018-08-02 RX ADMIN — INSULIN GLARGINE SCH UNITS: 100 INJECTION, SOLUTION SUBCUTANEOUS at 08:26

## 2018-08-02 RX ADMIN — METOPROLOL TARTRATE SCH MG: 25 TABLET, FILM COATED ORAL at 21:32

## 2018-08-02 RX ADMIN — DOXYCYCLINE HYCLATE SCH MG: 100 CAPSULE, GELATIN COATED ORAL at 08:23

## 2018-08-02 RX ADMIN — DABIGATRAN ETEXILATE MESYLATE SCH MG: 75 CAPSULE ORAL at 08:23

## 2018-08-02 RX ADMIN — METOPROLOL TARTRATE SCH MG: 25 TABLET, FILM COATED ORAL at 08:23

## 2018-08-02 RX ADMIN — INSULIN ASPART SCH UNITS: 100 INJECTION, SOLUTION INTRAVENOUS; SUBCUTANEOUS at 08:03

## 2018-08-02 RX ADMIN — ALLOPURINOL SCH MG: 100 TABLET ORAL at 08:23

## 2018-08-02 RX ADMIN — LEVOTHYROXINE SODIUM SCH MCG: 50 TABLET ORAL at 06:01

## 2018-08-02 RX ADMIN — INSULIN ASPART SCH UNITS: 100 INJECTION, SOLUTION INTRAVENOUS; SUBCUTANEOUS at 21:39

## 2018-08-02 NOTE — PHARMACY PROGRESS NOTE
Pharmacy Glycemic Short Note 2


Date of Service


Aug 2, 2018.





OUTPATIENT ANTIDIABETIC REGIMEN: 


* Invokamet 150/500 mg BID


* Glimepiride 4 mg daily


* Sitagliptin 100 mg daily














Test


  8/1/18


17:06 8/1/18


20:17 8/2/18


05:42 8/2/18


07:44


 


Bedside Glucose


  130 mg/dl


(70-99) 126 mg/dl


(70-99) 


  111 mg/dl


(70-99)


 


Random Glucose


  


  


  102 mg/dl


(70-99) 


 


 


Test


  8/2/18


11:02 


  


  


 


 


Bedside Glucose


  191 mg/dl


(70-99) 


  


  


 














ASSESSMENT:





8/2/18


* The patient received 34 units of insulin yesterday


* BSGs have been much improved since d/c of dextrose IVFs yesterday AM


* He received almost all basal insulin yesterday and was NPO for a majority of 

the day, but did have the dextrose fluids in the AM


* Diet has been advanced to type 2 diabetes


* Will plan to keep insulin regimen the same for now.  Lantus is still dosed 

per a scale so this will provide a higher dose if necessary.  Of note, BSG at 

lunch is elevated but nursing had a note that po intake this AM was not covered.





8/1/18


* Mr. Cervantes is a 68 y/o male admitted with A fib w/ RVR.  He has type 2 

diabetes that is not well controlled as per the A1c.


* BSGs were just slightly above goal last night but up to 325 mg/dL this AM - 

suspect this is maybe in part due to the dextrose IVFs, which are now stopped.  

Will still need basal/bolus insulin due to requiring 3+ oral agents as an 

outpatient.


* Pt is maintained on oral antidiabetic agents as an outpatient


 * Oral agents are not recommended for inpatient use d/t drug interactions, 

changing PO intake, and difficulty titrating for acute hyper/hypoglycemia. ADA 

recommends re-initiating outpatient oral agents 1-2 days prior to discharge if/

when appropriate if they were held on admission. 


* Will hold oral agents for admission and utilize SQ basal bolus insulin 

regimen which is the recommended regimen for inpatient glycemic control.


 * Will initiate weight based insulin dosing for insulin kirstin patient and 

titrate based on BSG trends. 














PLAN FOR INPATIENT GLYCEMIC CONTROL:


* Continue to hold outpatient oral diabetes medications





* Basal insulin with LANTUS SQ BID as per the following scale:  no change


 * 12 units for BSG less than 150


 * 22 units for  or above





* Bolus insulin - no change


 * NovoLog per scale ACHS or Q6hrs while NPO


 * Goal Range:  Low 110 mg/dL - High 150 mg/dL


 * Correction Factor:  20 mg/dL/unit


 * Nutritional / Prandial insulin per carb ratio of  1 unit per 8 grams CHO 

consumed








PLAN FOR DISCHARGE:


* Patient on 3+ oral agents without A1c at goal


* If tolerated, Invokamet dose could be increased (max is 150 mg/1000 mg BID).  

Another option would be to add a once daily dose of basal insulin - would 

recommend Lantus (or Basaglar) 15 units qHS.  Increase by 2 units every 3 days 

for AM BSG above 150 mg/dL.


 * Hesitant to recommend initiation of basal at 0.2 units/kg (25 units) because 

of his minimal requirements as an inpatient

## 2018-08-02 NOTE — CARDIOLOGY FOLLOW-UP
Subjective


General


Date of Service:


Aug 2, 2018.


Chief Complaint:  Follow-up atrial fibrillation, edema, aortic stenosis


Pt evaluation today including:  conversation w/ patient, physical exam





History of Present Illness


The patient is a 67 year old male seen in cardiology follow-up.


Patient states that his nausea has trended toward improvement.  He had a liquid 

diet and has had several bowel movements and feels his stomach is doing better.

  Telemetry reveals atrial fibrillation with mildly elevated ventricular rates 

in the range of 105 bpm 106 bpm this morning, which is a little bit higher than 

yesterday.





Allergies


Coded Allergies:  


     No Known Allergies (Unverified , 7/31/18)





Social History


Smoking Status:  Former Smoker


Hx Tobacco Use In Past Year?:  No


Hx Alcohol Use - Type And Amou:  No


Hx Substance Use - Type And Am:  No





Problem List


Medical Problems:


(1) Atrial fibrillation with rapid ventricular response


Status: Acute  





(2) Ileus


Status: Acute  





(3) Nausea


Status: Acute  











Physical Exam


Vital Signs





Last Vital Signs Documentation








  Date Time  Temp Pulse Resp B/P (MAP) Pulse Ox O2 Delivery O2 Flow Rate FiO2


 


8/2/18 08:30      Room Air  


 


8/2/18 06:59 37.1 113 17 117/76 (90) 95  1.0 











Physical Exam


Constitutional:  


   Level of Distress:  chronically ill


Head:  normocephalic


Neck:  supple


Lungs:  


   Auscultation:  no wheezing, no rales/crackles, no rhonchi


Cardiovascular:  


   Heart Auscultation:  irregular rate rhythm, pertinent finding (The patient's 

murmur is very faint and minimally audible)


Abdomen:  


   Inspection & Palpation:  soft, non-distended


Extremities:  pertinent finding (2+ bilateral lower extremity edema, with 

venous stasis changes, multiple healing superficial ulcerations on the left 

lower extremity)


Neurologic:  


   Gait & Station:  pertinent finding (No focal deficits)





Assessment and Plan


Assessment and Plan


Impression:


67-year-old male


1.  Critically severe aortic valve stenosis, currently compensated overall from 

a respiratory standpoint


2.  Chronic atrial fibrillation, ventricular rate mildly elevated


3.  Lower extremity edema, left lower extremity superficial ulcerations





Discussion/recommendations:


Regarding his atrial fibrillation, will ease up on his metoprolol tartrate to 

37.5 mg twice daily, with caution given severe aortic valve stenosis


Continue Pradaxa for stroke prophylaxis.





Regarding patient's edema, his renal function is stable status post exposure to 

iodinated contrast.


Proceed with IV furosemide, 20 mg today.





The patient's recent MRA report was obtained.  There is no significant stenosis 

in the large lower extremity vessels including is below the knee, the anterior 

tibial and posterior tibial.


The patient's lower extremity arterial duplex study images were reviewed with 

endovascular medicine, no indication for revascularization for bilateral 

peroneal artery stenosis.


Lower extremity wounds likely in part due to venous insufficiency, congestion, 

and will therefore treat with ongoing diuretic therapy to ease the congestion.


Ultimately will require cardiac catheterization and consideration toward aortic 

valve replacement at some point, but I would like to make sure his ulcers are 

trending toward improvement first.


Given his critically severe aortic valve stenosis, I think the titration of his 

diuretic therapy and optimizing his lower extremity edema is best performed in 

the hospital.





Laboratory Results





Last 24 Hours








Test


  8/1/18


17:06 8/1/18


20:17 8/2/18


05:42 8/2/18


07:44


 


Bedside Glucose 130 mg/dl  126 mg/dl   111 mg/dl 


 


White Blood Count   12.53 K/uL  


 


Red Blood Count   5.34 M/uL  


 


Hemoglobin   15.6 g/dL  


 


Hematocrit   47.0 %  


 


Mean Corpuscular Volume   88.0 fL  


 


Mean Corpuscular Hemoglobin   29.2 pg  


 


Mean Corpuscular Hemoglobin


Concent 


  


  33.2 g/dl 


  


 


 


RDW Standard Deviation   51.5 fL  


 


RDW Coefficient of Variation   16.0 %  


 


Platelet Count   120 K/uL  


 


Mean Platelet Volume   12.4 fL  


 


Platelet Estimate   DECREASED  


 


Sodium Level   134 mmol/L  


 


Potassium Level   3.6 mmol/L  


 


Chloride Level   101 mmol/L  


 


Carbon Dioxide Level   27 mmol/L  


 


Anion Gap   6.0 mmol/L  


 


Blood Urea Nitrogen   21 mg/dl  


 


Creatinine   0.89 mg/dl  


 


Est Creatinine Clear Calc


Drug Dose 


  


  101.1 ml/min 


  


 


 


Estimated GFR (


American) 


  


  101.8 


  


 


 


Estimated GFR (Non-


American 


  


  87.9 


  


 


 


BUN/Creatinine Ratio   23.6  


 


Random Glucose   102 mg/dl  


 


Calcium Level   8.5 mg/dl  


 


Magnesium Level   2.5 mg/dl  


 


Test


  8/2/18


11:02 


  


  


 


 


Bedside Glucose 191 mg/dl

## 2018-08-02 NOTE — CARDIOLOGY PROGRESS NOTE
Cardiology Progress Note


Date of Service


Aug 2, 2018.





Cardiology Progress Note


On review of telemetry, and assist the patient's heart rate has trended up with 

atrial fibrillation in the range of 130 bpm, this is significantly higher than 

the 85 bpm when I had initially assessed him yesterday.  He was finishing his 

evening meal.  He was sitting upright, and was


Short of breath.


I reviewed his computer record, I had intended for him to have a dose of 

furosemide 20 mg IV at 11 AM, but I had inadvertently ordered to start tomorrow.


Given his severe aortic valve stenosis medication changes with this patient 

need to be approached with a great degree of caution.


I had increased his metoprolol from 25 mg twice daily this morning to 37.5 mg 

twice daily and will continue this dose.


We will proceed with diuretics, furosemide 40 mg IV 1 stat, followed by 20 mg 

due tomorrow morning.  I reviewed the need for the status of furosemide with 

his nurse and the patient.

## 2018-08-03 VITALS
DIASTOLIC BLOOD PRESSURE: 83 MMHG | SYSTOLIC BLOOD PRESSURE: 120 MMHG | HEART RATE: 73 BPM | TEMPERATURE: 98.06 F | OXYGEN SATURATION: 95 %

## 2018-08-03 VITALS
OXYGEN SATURATION: 95 % | DIASTOLIC BLOOD PRESSURE: 80 MMHG | SYSTOLIC BLOOD PRESSURE: 113 MMHG | HEART RATE: 96 BPM | TEMPERATURE: 98.78 F

## 2018-08-03 VITALS
DIASTOLIC BLOOD PRESSURE: 93 MMHG | HEART RATE: 100 BPM | SYSTOLIC BLOOD PRESSURE: 135 MMHG | TEMPERATURE: 98.6 F | OXYGEN SATURATION: 95 %

## 2018-08-03 VITALS
SYSTOLIC BLOOD PRESSURE: 110 MMHG | DIASTOLIC BLOOD PRESSURE: 72 MMHG | TEMPERATURE: 99.86 F | HEART RATE: 96 BPM | OXYGEN SATURATION: 96 %

## 2018-08-03 VITALS
SYSTOLIC BLOOD PRESSURE: 119 MMHG | DIASTOLIC BLOOD PRESSURE: 77 MMHG | HEART RATE: 99 BPM | OXYGEN SATURATION: 95 % | TEMPERATURE: 97.88 F

## 2018-08-03 VITALS
TEMPERATURE: 98.42 F | HEART RATE: 100 BPM | OXYGEN SATURATION: 96 % | SYSTOLIC BLOOD PRESSURE: 111 MMHG | DIASTOLIC BLOOD PRESSURE: 69 MMHG

## 2018-08-03 LAB
BUN SERPL-MCNC: 18 MG/DL (ref 7–18)
CALCIUM SERPL-MCNC: 8.8 MG/DL (ref 8.5–10.1)
CO2 SERPL-SCNC: 29 MMOL/L (ref 21–32)
CREAT SERPL-MCNC: 0.97 MG/DL (ref 0.6–1.4)
EOSINOPHIL NFR BLD AUTO: 123 K/UL (ref 130–400)
GLUCOSE SERPL-MCNC: 103 MG/DL (ref 70–99)
HCT VFR BLD CALC: 47.7 % (ref 42–52)
HGB BLD-MCNC: 15.7 G/DL (ref 14–18)
MCH RBC QN AUTO: 29 PG (ref 25–34)
MCHC RBC AUTO-ENTMCNC: 32.9 G/DL (ref 32–36)
MCV RBC AUTO: 88 FL (ref 80–100)
PMV BLD AUTO: 12.4 FL (ref 7.4–10.4)
POTASSIUM SERPL-SCNC: 3.4 MMOL/L (ref 3.5–5.1)
RED CELL DISTRIBUTION WIDTH CV: 16 % (ref 11.5–14.5)
RED CELL DISTRIBUTION WIDTH SD: 50.7 FL (ref 36.4–46.3)
SODIUM SERPL-SCNC: 136 MMOL/L (ref 136–145)
WBC # BLD AUTO: 9.99 K/UL (ref 4.8–10.8)

## 2018-08-03 RX ADMIN — METOPROLOL TARTRATE SCH MG: 25 TABLET, FILM COATED ORAL at 20:37

## 2018-08-03 RX ADMIN — FUROSEMIDE SCH MLS/MIN: 10 INJECTION, SOLUTION INTRAMUSCULAR; INTRAVENOUS at 09:00

## 2018-08-03 RX ADMIN — METOPROLOL TARTRATE SCH MG: 25 TABLET, FILM COATED ORAL at 09:00

## 2018-08-03 RX ADMIN — INSULIN ASPART SCH UNITS: 100 INJECTION, SOLUTION INTRAVENOUS; SUBCUTANEOUS at 17:21

## 2018-08-03 RX ADMIN — INSULIN GLARGINE SCH UNITS: 100 INJECTION, SOLUTION SUBCUTANEOUS at 21:33

## 2018-08-03 RX ADMIN — DABIGATRAN ETEXILATE MESYLATE SCH MG: 75 CAPSULE ORAL at 09:00

## 2018-08-03 RX ADMIN — DABIGATRAN ETEXILATE MESYLATE SCH MG: 75 CAPSULE ORAL at 20:41

## 2018-08-03 RX ADMIN — GEMFIBROZIL SCH MG: 600 TABLET ORAL at 20:37

## 2018-08-03 RX ADMIN — INSULIN ASPART SCH UNITS: 100 INJECTION, SOLUTION INTRAVENOUS; SUBCUTANEOUS at 12:15

## 2018-08-03 RX ADMIN — DOXYCYCLINE HYCLATE SCH MG: 100 CAPSULE, GELATIN COATED ORAL at 09:00

## 2018-08-03 RX ADMIN — LEVOTHYROXINE SODIUM SCH MCG: 50 TABLET ORAL at 05:38

## 2018-08-03 RX ADMIN — ACETAMINOPHEN PRN MG: 325 TABLET ORAL at 01:24

## 2018-08-03 RX ADMIN — Medication SCH MG: at 09:00

## 2018-08-03 RX ADMIN — BUPROPION HYDROCHLORIDE SCH MG: 150 TABLET, EXTENDED RELEASE ORAL at 20:37

## 2018-08-03 RX ADMIN — BUPROPION HYDROCHLORIDE SCH MG: 150 TABLET, EXTENDED RELEASE ORAL at 09:00

## 2018-08-03 RX ADMIN — GEMFIBROZIL SCH MG: 600 TABLET ORAL at 09:00

## 2018-08-03 RX ADMIN — ALLOPURINOL SCH MG: 100 TABLET ORAL at 09:00

## 2018-08-03 RX ADMIN — INSULIN ASPART SCH UNITS: 100 INJECTION, SOLUTION INTRAVENOUS; SUBCUTANEOUS at 21:32

## 2018-08-03 RX ADMIN — INSULIN GLARGINE SCH UNITS: 100 INJECTION, SOLUTION SUBCUTANEOUS at 09:00

## 2018-08-03 RX ADMIN — ACETAMINOPHEN PRN MG: 325 TABLET ORAL at 20:43

## 2018-08-03 RX ADMIN — CALCIUM CARBONATE PRN MG: 500 TABLET ORAL at 12:15

## 2018-08-03 NOTE — PROGRESS NOTE
Subjective


Date of Service:


Aug 3, 2018.


Subjective


Pt evaluation today including:  conversation w/ patient, physical exam, lab 

review, review of studies, review of inpatient medication list


Saw/examined the patient in room 230-2


His breathing has improved, heart rates have improved as well





Problem List


Medical Problems:


(1) Atrial fibrillation with rapid ventricular response


Status: Acute  





(2) Ileus


Status: Acute  





(3) Nausea


Status: Acute  











Objective


Vital Signs











  Date Time  Temp Pulse Resp B/P (MAP) Pulse Ox O2 Delivery O2 Flow Rate FiO2


 


8/3/18 12:08 37.0 100 18 135/93 (107) 95 Room Air  


 


8/3/18 08:00      Room Air 2.0 





      Nasal Cannula  


 


8/3/18 07:33 36.6 99 18 119/77 (91) 95 Room Air  


 


8/3/18 03:39 36.7 73 17 120/83 (95) 95 Room Air  


 


8/2/18 23:16 36.7 69 19 110/78 (89) 93 Room Air  


 


8/2/18 20:00     96 Nasal Cannula 2.0 


 


8/2/18 19:10 36.5 117 20 132/87 (102) 96 Nasal Cannula 1.0 


 


8/2/18 17:47  136  141/77 (98) 96 Nasal Cannula 2.0 


 


8/2/18 16:00     96 Nasal Cannula 2.0 


 


8/2/18 15:06 36.6 118 18 111/66 (81) 96 Nasal Cannula 1.0 











Physical Exam


Respiratory/Chest:  no respiratory distress, no accessory muscle use, + 

decreased breath sounds


Cardiovascular:  + irregularly irregular


Extremities:  + swelling (improved discoloration of the LLE; less warm to touch)

, + pertinent finding





Laboratory Results





Last 24 Hours








Test


  8/2/18


16:21 8/2/18


20:18 8/3/18


07:08 8/3/18


07:24


 


Bedside Glucose 150 mg/dl  167 mg/dl   115 mg/dl 


 


White Blood Count   9.99 K/uL  


 


Red Blood Count   5.42 M/uL  


 


Hemoglobin   15.7 g/dL  


 


Hematocrit   47.7 %  


 


Mean Corpuscular Volume   88.0 fL  


 


Mean Corpuscular Hemoglobin   29.0 pg  


 


Mean Corpuscular Hemoglobin


Concent 


  


  32.9 g/dl 


  


 


 


RDW Standard Deviation   50.7 fL  


 


RDW Coefficient of Variation   16.0 %  


 


Platelet Count   123 K/uL  


 


Mean Platelet Volume   12.4 fL  


 


Sodium Level   136 mmol/L  


 


Potassium Level   3.4 mmol/L  


 


Chloride Level   100 mmol/L  


 


Carbon Dioxide Level   29 mmol/L  


 


Anion Gap   7.0 mmol/L  


 


Blood Urea Nitrogen   18 mg/dl  


 


Creatinine   0.97 mg/dl  


 


Est Creatinine Clear Calc


Drug Dose 


  


  92.4 ml/min 


  


 


 


Estimated GFR (


American) 


  


  92.6 


  


 


 


Estimated GFR (Non-


American 


  


  79.9 


  


 


 


BUN/Creatinine Ratio   19.0  


 


Random Glucose   103 mg/dl  


 


Calcium Level   8.8 mg/dl  


 


Magnesium Level   2.6 mg/dl  


 


Test


  8/3/18


11:21 


  


  


 


 


Bedside Glucose 290 mg/dl    











Assessment and Plan


This is a 67 year old male with a past medical history of DM2, HTN, HLD, 

depression/anxiety, paroxysmal atrial fibrillation on long-term anticoagulation

, hypothyroidism - presents with nausea and shortness of breath





Likely Chronic Atrial Fibrillation with RVR


8/3


- metoprolol tartrate 37.5mg BID


- Pradaxa for now - appreciate cardiology input; may need Coumadin due to 

valvular disease


- may need cardiac catheterization during this admission





8/2


- appreciate cardiology input


- Metoprolol was decreased today due to recent diagnosis of critically severe 

aortic stenosis


- continue Pradaxa





8/1


- patient with a hx. of atrial fibrillation, presented with rapid ventricular 

response


- initially started on a Cardizem drip; now back on metoprolol tartrate


- continue Pradaxa for anticoagulation


- appreciate cardiology input





Newly Diagnosed Critically Severe Aortic Stenosis


8/3


- currently on Lasix, and Aldactone started


- continue b-blocker


- may need to add ACE-I or ARB due to DM2





8/2


- continue with Lasix as per cardiology, will likely need diuretics on discharge





8/1


- holding IVFs, changing IV abx. to PO to prevent overload


- EF of around 50-55%


- will need outpatient aortic valve work-up; likely surgical repair at tertiary 

care center





Peripheral Vascular Disease


LLE Wound Ulcerations


8/2


- appreciate ID input


- doxycycline x 14 days


- outpatient wound care input





8/1


- arterial disease noted; L peroneal artery stenosis


- Dr. Ramírez consulted for vascular management


- once optimized, can d/c home


- changed IV abx. to Doxycycline


- ID consulted for further input, length of treatment, etc.





Mild Ileus


- spoke with GI, this likely does not represent ileus, though read as such on CT


- will give clears, stop IVFs, and advance diet as tolerated





DM2


- ha1c of 8.9%, not well controlled


- currently on 4 oral agents as outpatient


- will stop oral agents and insulin sliding scale with Lantus


- appreciate glycemic control consultation





Hypothyroidism


- continue Synthroid





DVT ppx


- Pradaxa





FULL CODE

## 2018-08-03 NOTE — PHARMACY PROGRESS NOTE
Pharmacy Glycemic Short Note 2


Date of Service


Aug 3, 2018.





OUTPATIENT ANTIDIABETIC REGIMEN: 


* Invokamet 150/500 mg BID


* Glimepiride 4 mg daily


* Sitagliptin 100 mg daily


* 








Item Value  Date Time


 


Bedside Glucose 157 mg/dl H 8/1/18 1121


 


Bedside Glucose 130 mg/dl H 8/1/18 1706


 


Bedside Glucose 126 mg/dl H 8/1/18 2017


 


Bedside Glucose 111 mg/dl H 8/2/18 0744


 


Bedside Glucose 191 mg/dl H 8/2/18 1102


 


Bedside Glucose 150 mg/dl H 8/2/18 1621


 


Bedside Glucose 167 mg/dl H 8/2/18 2018


 


Bedside Glucose 115 mg/dl H 8/3/18 0724


























ASSESSMENT:


* Patient is currently receiving an average of ~50 units of insulin per day


 * 34 units of basal insulin


 * 16 units of prandial/correctional insulin but PO intake has been minimal but 

advancing





* Risk factors for insulin resistance are decreasing over the past 24hrs


 * Infection is being adequately treated/Pt status improving


 * Dextrose containing IVF D/C








* Changes needed to insulin regimen: 


 * AM Fasting BSG = 115 mg/dl which is in goal range. No changes needed to 

basal insulin


 * Only one BSG elevated yesterday prior to lunch - but this was d/t CHO not 

covered at breakfast per nursing notation - not inadequate dosing parameters. 

No changes needed to CF/CR 














PLAN FOR INPATIENT GLYCEMIC CONTROL:


* Continue to hold outpatient oral diabetes medications





* Basal insulin: no change. Continue basal insulin of 24 units/day but equal 

out dosing Q12hrs. 


 * Lantus 17 units SQ BID








* Bolus insulin - no change


 * NovoLog per scale ACHS or Q6hrs while NPO


 * Goal Range:  Low 110 mg/dL - High 150 mg/dL


 * Correction Factor:  20 mg/dL/unit


 * Nutritional / Prandial insulin per carb ratio of  1 unit per 8 grams CHO 

consumed








PLAN FOR DISCHARGE:


* Patient on 3+ oral agents without A1c at goal


* If tolerated, Invokamet dose could be increased (max is 150 mg/1000 mg BID).  

Another option would be to add a once daily dose of basal insulin - would 

recommend Lantus (or Basaglar) vs NPH depending on insulin insulin coverage. 

Recommend starting dosing at 15 units qHS and increase by 2 units every 3 days 

for AM BSG above 150 mg/dL.

## 2018-08-03 NOTE — CARDIOLOGY FOLLOW-UP
Subjective


General


Date of Service:


Aug 3, 2018.


Chief Complaint:  Follow-up atrial fibrillation, edema, aortic stenosis


Pt evaluation today including:  conversation w/ patient, physical exam





History of Present Illness


The patient is a 67 year old male seen in follow-up.


The patient looks and feels much improved today.  When I had reassessed him 

yesterday afternoon atrial fibrillation with rapid ventricular rate in the 

range of 130-135 bpm was present, he was tired, and tachypneic.  She received 

40 mg of IV furosemide, and had 5.1 L of urine output until midnight last 

night.  His fluid balance was -3.2 L therefore yesterday.  Apparently overnight 

last night he had some difficulty.  He was thinking about his job.  He is 

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  He 

ultimately received some lorazepam and rested well.  Martin catheter remains in 

place.  Telemetry reveals stable atrial fibrillation with rates in the range of 

90 bpm at present.  Much improved compared to yesterday.





Allergies


Coded Allergies:  


     No Known Allergies (Unverified , 7/31/18)





Social History


Smoking Status:  Former Smoker


Hx Tobacco Use In Past Year?:  No


Hx Alcohol Use - Type And Amou:  No


Hx Substance Use - Type And Am:  No





Problem List


Medical Problems:


(1) Atrial fibrillation with rapid ventricular response


Status: Acute  





(2) Ileus


Status: Acute  





(3) Nausea


Status: Acute  











Physical Exam


Vital Signs





Last Vital Signs Documentation








  Date Time  Temp Pulse Resp B/P (MAP) Pulse Ox O2 Delivery O2 Flow Rate FiO2


 


8/3/18 12:08 37.0 100 18 135/93 (107) 95 Room Air  


 


8/3/18 08:00       2.0 











Physical Exam


Constitutional:  


   Level of Distress:  chronically ill


Head:  normocephalic


Neck:  supple


Lungs:  


   Auscultation:  no wheezing, no rales/crackles, no rhonchi


Cardiovascular:  


   Heart Auscultation:  irregular rate rhythm, pertinent finding (The patient's 

murmur is very faint and minimally audible)


Abdomen:  


   Inspection & Palpation:  soft, non-distended


Extremities:  pertinent finding (Venous stasis changes, 1 to borderline 2+ edema

, trending toward improvement)


Neurologic:  


   Gait & Station:  pertinent finding (No focal deficits)





Assessment and Plan


Assessment and Plan


Impression:


67-year-old male


1.  Critically severe aortic valve stenosis, with superimposed acute heart 

failure with preserved ejection fraction (HFpEF)


2.  Chronic atrial fibrillation, ventricular rate mildly elevated


3.  Lower extremity edema, left lower extremity superficial ulcerations





Discussion/recommendations:


Regarding his atrial fibrillation, will ease up on his metoprolol tartrate to 

37.5 mg twice daily, with caution given severe aortic valve stenosis.


Continue Pradaxa for stroke prophylaxis for now, although given his native 

severe valvular heart disease , likely cannot classify him as "nonvalvular 

atrial fibrillation". Will consider transition from Pradaxa to coumadin perhaps 

after cardiac catheterization. 





Continue diuresis for volume overload. Received 20 mg IV furosemide this am. 

Supplement potassium, add low dose aldactone.





Regarding his peripheral arterial disease.  He has bilateral peroneal artery 

disease with intact anterior tibial and posterior tibial vessels.  Therefore 

conservative treatment is recommended.  We will try to diurese him to reduce 

his lower extremity edema and venous congestion, and this should help with 

healing of his left lower leg ulcers.





As the patient's hospitalization has developed, it is clear that he has 

symptomatic critically severe aortic valve stenosis.  I had initially planned 

to optimize his edema and perhaps pursue diagnostic cardiac catheterization for 

coronary angiography down the road as an outpatient, but I think the patient is 

perhaps best served by having his volume status optimized here in proceeding 

with cardiac catheterization perhaps this admission to delineate his coronary 

anatomy.  He will be very challenging to bring him back for an outpatient 

cardiac catheterization due to difficulties with his volume status.





Timing of cardiothoracic surgery consultation will be determined based on his 

cardiac catheterization.  If he is found to have high risk CAD, it may be most 

practical to proceed with surgical consultation sooner rather than later.





Laboratory Results





Last 24 Hours








Test


  8/2/18


16:21 8/2/18


20:18 8/3/18


07:08 8/3/18


07:24


 


Bedside Glucose 150 mg/dl  167 mg/dl   115 mg/dl 


 


White Blood Count   9.99 K/uL  


 


Red Blood Count   5.42 M/uL  


 


Hemoglobin   15.7 g/dL  


 


Hematocrit   47.7 %  


 


Mean Corpuscular Volume   88.0 fL  


 


Mean Corpuscular Hemoglobin   29.0 pg  


 


Mean Corpuscular Hemoglobin


Concent 


  


  32.9 g/dl 


  


 


 


RDW Standard Deviation   50.7 fL  


 


RDW Coefficient of Variation   16.0 %  


 


Platelet Count   123 K/uL  


 


Mean Platelet Volume   12.4 fL  


 


Sodium Level   136 mmol/L  


 


Potassium Level   3.4 mmol/L  


 


Chloride Level   100 mmol/L  


 


Carbon Dioxide Level   29 mmol/L  


 


Anion Gap   7.0 mmol/L  


 


Blood Urea Nitrogen   18 mg/dl  


 


Creatinine   0.97 mg/dl  


 


Est Creatinine Clear Calc


Drug Dose 


  


  92.4 ml/min 


  


 


 


Estimated GFR (


American) 


  


  92.6 


  


 


 


Estimated GFR (Non-


American 


  


  79.9 


  


 


 


BUN/Creatinine Ratio   19.0  


 


Random Glucose   103 mg/dl  


 


Calcium Level   8.8 mg/dl  


 


Magnesium Level   2.6 mg/dl  


 


Test


  8/3/18


11:21 


  


  


 


 


Bedside Glucose 290 mg/dl

## 2018-08-04 VITALS
HEART RATE: 96 BPM | SYSTOLIC BLOOD PRESSURE: 114 MMHG | DIASTOLIC BLOOD PRESSURE: 70 MMHG | OXYGEN SATURATION: 94 % | TEMPERATURE: 98.42 F

## 2018-08-04 VITALS
DIASTOLIC BLOOD PRESSURE: 78 MMHG | HEART RATE: 94 BPM | OXYGEN SATURATION: 96 % | SYSTOLIC BLOOD PRESSURE: 118 MMHG | TEMPERATURE: 98.24 F

## 2018-08-04 VITALS
DIASTOLIC BLOOD PRESSURE: 78 MMHG | SYSTOLIC BLOOD PRESSURE: 119 MMHG | TEMPERATURE: 98.24 F | HEART RATE: 94 BPM | OXYGEN SATURATION: 95 %

## 2018-08-04 VITALS
TEMPERATURE: 97.88 F | DIASTOLIC BLOOD PRESSURE: 74 MMHG | OXYGEN SATURATION: 95 % | SYSTOLIC BLOOD PRESSURE: 119 MMHG | HEART RATE: 100 BPM

## 2018-08-04 VITALS
OXYGEN SATURATION: 96 % | DIASTOLIC BLOOD PRESSURE: 83 MMHG | TEMPERATURE: 97.88 F | SYSTOLIC BLOOD PRESSURE: 115 MMHG | HEART RATE: 101 BPM

## 2018-08-04 VITALS
SYSTOLIC BLOOD PRESSURE: 130 MMHG | DIASTOLIC BLOOD PRESSURE: 85 MMHG | OXYGEN SATURATION: 94 % | HEART RATE: 94 BPM | TEMPERATURE: 99.14 F

## 2018-08-04 LAB
BUN SERPL-MCNC: 23 MG/DL (ref 7–18)
CALCIUM SERPL-MCNC: 9 MG/DL (ref 8.5–10.1)
CO2 SERPL-SCNC: 29 MMOL/L (ref 21–32)
CREAT SERPL-MCNC: 0.87 MG/DL (ref 0.6–1.4)
EOSINOPHIL NFR BLD AUTO: 152 K/UL (ref 130–400)
GLUCOSE SERPL-MCNC: 124 MG/DL (ref 70–99)
HCT VFR BLD CALC: 47.4 % (ref 42–52)
HGB BLD-MCNC: 15.8 G/DL (ref 14–18)
MCH RBC QN AUTO: 29.4 PG (ref 25–34)
MCHC RBC AUTO-ENTMCNC: 33.3 G/DL (ref 32–36)
MCV RBC AUTO: 88.3 FL (ref 80–100)
PMV BLD AUTO: 12.5 FL (ref 7.4–10.4)
POTASSIUM SERPL-SCNC: 3.8 MMOL/L (ref 3.5–5.1)
RED CELL DISTRIBUTION WIDTH CV: 15.9 % (ref 11.5–14.5)
RED CELL DISTRIBUTION WIDTH SD: 50.5 FL (ref 36.4–46.3)
SODIUM SERPL-SCNC: 135 MMOL/L (ref 136–145)
WBC # BLD AUTO: 9.05 K/UL (ref 4.8–10.8)

## 2018-08-04 RX ADMIN — METOPROLOL TARTRATE SCH MG: 25 TABLET, FILM COATED ORAL at 08:37

## 2018-08-04 RX ADMIN — Medication SCH MG: at 09:00

## 2018-08-04 RX ADMIN — BUPROPION HYDROCHLORIDE SCH MG: 150 TABLET, EXTENDED RELEASE ORAL at 21:11

## 2018-08-04 RX ADMIN — METOPROLOL TARTRATE SCH MG: 25 TABLET, FILM COATED ORAL at 21:12

## 2018-08-04 RX ADMIN — DABIGATRAN ETEXILATE MESYLATE SCH MG: 75 CAPSULE ORAL at 21:10

## 2018-08-04 RX ADMIN — INSULIN ASPART SCH UNITS: 100 INJECTION, SOLUTION INTRAVENOUS; SUBCUTANEOUS at 17:29

## 2018-08-04 RX ADMIN — SPIRONOLACTONE SCH MG: 25 TABLET, FILM COATED ORAL at 08:38

## 2018-08-04 RX ADMIN — DABIGATRAN ETEXILATE MESYLATE SCH MG: 75 CAPSULE ORAL at 08:38

## 2018-08-04 RX ADMIN — BUPROPION HYDROCHLORIDE SCH MG: 150 TABLET, EXTENDED RELEASE ORAL at 08:37

## 2018-08-04 RX ADMIN — INSULIN ASPART SCH UNITS: 100 INJECTION, SOLUTION INTRAVENOUS; SUBCUTANEOUS at 12:10

## 2018-08-04 RX ADMIN — GEMFIBROZIL SCH MG: 600 TABLET ORAL at 08:37

## 2018-08-04 RX ADMIN — LEVOTHYROXINE SODIUM SCH MCG: 50 TABLET ORAL at 06:00

## 2018-08-04 RX ADMIN — ALLOPURINOL SCH MG: 100 TABLET ORAL at 08:37

## 2018-08-04 RX ADMIN — INSULIN GLARGINE SCH UNITS: 100 INJECTION, SOLUTION SUBCUTANEOUS at 08:46

## 2018-08-04 RX ADMIN — INSULIN ASPART SCH UNITS: 100 INJECTION, SOLUTION INTRAVENOUS; SUBCUTANEOUS at 21:19

## 2018-08-04 RX ADMIN — INSULIN ASPART SCH UNITS: 100 INJECTION, SOLUTION INTRAVENOUS; SUBCUTANEOUS at 08:42

## 2018-08-04 RX ADMIN — GEMFIBROZIL SCH MG: 600 TABLET ORAL at 21:11

## 2018-08-04 RX ADMIN — LORAZEPAM PRN MG: 0.5 TABLET ORAL at 02:16

## 2018-08-04 RX ADMIN — INSULIN GLARGINE SCH UNITS: 100 INJECTION, SOLUTION SUBCUTANEOUS at 21:20

## 2018-08-04 RX ADMIN — FUROSEMIDE SCH MLS/MIN: 10 INJECTION, SOLUTION INTRAMUSCULAR; INTRAVENOUS at 08:37

## 2018-08-04 NOTE — CARDIOLOGY FOLLOW-UP
Subjective


General


Date of Service:


Aug 4, 2018.


Chief Complaint:  Follow-up atrial fibrillation, edema, aortic stenosis


Pt evaluation today including:  conversation w/ patient, physical exam, chart 

review, lab review, review of studies, conversation w/ consultant, review of 

inpatient medication list





History of Present Illness


The patient is a 67 year old male seen in follow-up.


Edema improving.


Denies shortness of breath at rest.


Remains in atrial fibrillation with a heart rate ranging from  bpm on 

telemetry.


Denies chest discomfort.


Requesting order to get out of bed.


Offers no other complaints.





Allergies


Coded Allergies:  


     No Known Allergies (Unverified , 7/31/18)





Social History


Smoking Status:  Former Smoker


Hx Tobacco Use In Past Year?:  No


Hx Alcohol Use - Type And Amou:  No


Hx Substance Use - Type And Am:  No





Problem List


Medical Problems:


(1) Atrial fibrillation with rapid ventricular response


Status: Acute  





(2) Ileus


Status: Acute  





(3) Nausea


Status: Acute  











Review of Systems


Respiratory:  + dyspnea on exertion, No cough, No sputum, No wheezing, No 

shortness of breath, No dyspnea at rest, No hemoptysis


Cardiac:  + edema, No chest pain, No orthopnea, No PND, No claudication, No 

palpitations





Physical Exam


Vital Signs





Last Vital Signs Documentation








  Date Time  Temp Pulse Resp B/P (MAP) Pulse Ox O2 Delivery O2 Flow Rate FiO2


 


8/4/18 11:41 36.8 94 20 119/78 (92) 95 Room Air  


 


8/4/18 08:00       2.0 











Physical Exam


Constitutional:  


   Level of Distress:  chronically ill


Head:  normocephalic


Neck:  supple


Lungs:  


   Auscultation:  no wheezing, no rales/crackles, no rhonchi


Cardiovascular:  


   Heart Auscultation:  irregular rate rhythm, pertinent finding (The patient's 

murmur is very faint and minimally audible)


Abdomen:  


   Inspection & Palpation:  soft, non-distended


Extremities:  pertinent finding (Venous stasis changes, 1+ bilateral pretibial 

edema with stasis changes.)


Neurologic:  


   Gait & Station:  pertinent finding (No focal deficits)





Assessment and Plan


Assessment and Plan


Final impression


1.  Severe aortic stenosis


2.  Decompensated diastolic heart failure in setting of rapid atrial 

fibrillation and severe aortic stenosis


2.  Chronic atrial fibrillation with borderline rate control


3.  Lower extremity edema, left lower extremity superficial ulcerations





Discussion/recommendations:


Continue intravenous diuretic therapy.


Continue current dose of beta-blocker therapy and monitor telemetry.


Tentative plan for coronary angiography prior to hospital discharge in 

preparation for aortic valve replacement.


Repeat basic metabolic panel in a.m.


Will likely transition patient from Pradaxa to Coumadin post catheterization.





Laboratory Results





Last 24 Hours








Test


  8/3/18


16:45 8/3/18


21:06 8/4/18


05:38 8/4/18


07:13


 


Bedside Glucose 173 mg/dl  196 mg/dl   134 mg/dl 


 


White Blood Count   9.05 K/uL  


 


Red Blood Count   5.37 M/uL  


 


Hemoglobin   15.8 g/dL  


 


Hematocrit   47.4 %  


 


Mean Corpuscular Volume   88.3 fL  


 


Mean Corpuscular Hemoglobin   29.4 pg  


 


Mean Corpuscular Hemoglobin


Concent 


  


  33.3 g/dl 


  


 


 


RDW Standard Deviation   50.5 fL  


 


RDW Coefficient of Variation   15.9 %  


 


Platelet Count   152 K/uL  


 


Mean Platelet Volume   12.5 fL  


 


Sodium Level   135 mmol/L  


 


Potassium Level   3.8 mmol/L  


 


Chloride Level   100 mmol/L  


 


Carbon Dioxide Level   29 mmol/L  


 


Anion Gap   6.0 mmol/L  


 


Blood Urea Nitrogen   23 mg/dl  


 


Creatinine   0.87 mg/dl  


 


Est Creatinine Clear Calc


Drug Dose 


  


  102.2 ml/min 


  


 


 


Estimated GFR (


American) 


  


  102.8 


  


 


 


Estimated GFR (Non-


American 


  


  88.7 


  


 


 


BUN/Creatinine Ratio   26.1  


 


Random Glucose   124 mg/dl  


 


Calcium Level   9.0 mg/dl  


 


Test


  8/4/18


11:38 


  


  


 


 


Bedside Glucose 180 mg/dl

## 2018-08-04 NOTE — PROGRESS NOTE
Subjective


Date of Service:


Aug 4, 2018.


Subjective


Pt evaluation today including:  conversation w/ patient, physical exam, lab 

review, review of studies, review of inpatient medication list


Saw/examined the patient in room 230-2


Breathing status improved


Denies chest pain/palpitations





Problem List


Medical Problems:


(1) Atrial fibrillation with rapid ventricular response


Status: Acute  





(2) Ileus


Status: Acute  





(3) Nausea


Status: Acute  











Review of Systems


Constitutional:  No fever, No chills


Respiratory:  No cough, No sputum, No shortness of breath


Cardiac:  No chest pain


Abdomen:  No pain, No nausea, No vomiting, No diarrhea, No constipation, No GI 

bleeding


Neurologic:  No memory loss, No paralysis, No weakness





Medications





Current Inpatient Medications








 Medications


  (Trade)  Dose


 Ordered  Sig/Bella


 Route  Start Time


 Stop Time Status Last Admin


Dose Admin


 


 Ioversol


  (Optiray 320)  125 ml  UD  PRN


 IV  7/31/18 17:15


 8/4/18 17:14   


 


 


 Acetaminophen


  (Tylenol Tab)  650 mg  Q4H  PRN


 PO  7/31/18 18:30


 8/30/18 18:29  8/3/18 20:43


650 MG


 


 Ondansetron HCl


  (Zofran Inj)  4 mg  Q6H  PRN


 IV  7/31/18 18:30


 8/30/18 18:29   


 


 


 Miscellaneous


 Information


  (Consult


 Glycemic


 Management


 Pharmacy)  1 ea  UD  PRN


 N/A  7/31/18 19:06


 8/30/18 19:05   


 


 


 Glucose


  (Glucose 40% Gel)  15-30


 GRAMS 15


 GRAMS...  UD  PRN


 PO  7/31/18 18:30


 8/30/18 18:29   


 


 


 Glucose


  (Glucose Chew


 Tab)  4-8


 Tablets 4


 Tabl...  UD  PRN


 PO  7/31/18 18:30


 8/30/18 18:29   


 


 


 Dextrose


  (Dextrose 50%


 50ML Syringe)  25-50ML


 25ML FOR


 ...  UD  PRN


 IV  7/31/18 18:30


 8/30/18 18:29   


 


 


 Glucagon


  (Glucagon Inj)  1 mg  UD  PRN


 SQ  7/31/18 18:30


 8/30/18 18:29   


 


 


 Carbohydrates


  (Carbohydrates


 For Hypoglycemia)  15-30 GRAMS


 15 grams if


 BSG 54-69...  UD  PRN


 PO  7/31/18 18:30


 8/30/18 18:29   


 


 


 Allopurinol


  (Zyloprim Tab)  100 mg  DAILY


 PO  8/1/18 09:00


 8/31/18 08:59  8/4/18 08:37


100 MG


 


 Bupropion HCl


  (Wellbutrin-Sr


 Tab)  150 mg  BID


 PO  7/31/18 21:00


 8/30/18 20:59  8/4/18 08:37


150 MG


 


 Dabigatran


  (Pradaxa Cap)  150 mg  BID


 PO  7/31/18 21:00


 8/30/18 20:59  8/4/18 08:38


150 MG


 


 Gemfibrozil


  (Lopid Tab)  600 mg  BID


 PO  7/31/18 21:00


 8/30/18 20:59  8/4/18 08:37


600 MG


 


 Levothyroxine


 Sodium


  (Synthroid Tab)  50 mcg  DAILYBB


 PO  8/1/18 06:00


 8/31/18 05:59  8/4/18 06:00


50 MCG


 


 Magnesium Oxide


  (Mag-Ox Tab)  400 mg  BID


 PO  7/31/18 21:00


 8/30/18 20:59 Future Hold 8/3/18 09:00


400 MG


 


 Insulin Aspart


  (novoLOG ASPART)  **SLIDING


 SCALE**


 **If C...  ACHS


 SQ  8/1/18 17:15


 8/31/18 17:14  8/4/18 12:10


10 UNITS


 


 Metoprolol


 Tartrate


  (Lopressor Tab)  37.5 mg  BID


 PO  8/2/18 21:00


 8/30/18 20:59  8/4/18 08:37


37.5 MG


 


 Furosemide 20 mg/


 Syringe  2 ml @ 4


 mls/min  DAILY


 IV  8/3/18 09:00


 9/2/18 08:59  8/4/18 08:37


4 MLS/MIN


 


 Calcium Carbonate


  (Tums Chew Tab)  500 mg  QID  PRN


 PO  8/2/18 22:45


 9/1/18 22:44  8/3/18 12:15


500 MG


 


 Insulin Glargine


  (Lantus Solostar


 Pen)  17 units  BID


 SC  8/3/18 09:00


 9/2/18 08:59  8/4/18 08:46


17 UNITS


 


 Spironolactone


  (Aldactone Tab)  12.5 mg  QAM


 PO  8/4/18 09:00


 9/3/18 08:59  8/4/18 08:38


12.5 MG


 


 Lorazepam


  (Ativan Tab)  0.25 mg  HS  PRN


 PO  8/4/18 02:00


 9/3/18 01:59  8/4/18 02:16


0.25 MG











Objective


Vital Signs











  Date Time  Temp Pulse Resp B/P (MAP) Pulse Ox O2 Delivery O2 Flow Rate FiO2


 


8/4/18 11:41 36.8 94 20 119/78 (92) 95 Room Air  


 


8/4/18 08:00      Room Air 2.0 





      Nasal Cannula  


 


8/4/18 07:22 36.8 94 20 118/78 (91) 96 Room Air  


 


8/4/18 03:45 36.6 101 16 115/83 (94) 96 Room Air  


 


8/3/18 23:45 36.9 100 18 111/69 (83) 96 Room Air  


 


8/3/18 20:00      Room Air 2.0 





      Nasal Cannula  


 


8/3/18 19:59 37.7 96 20 110/72 (85) 96 Room Air  


 


8/3/18 16:01 37.1 96 19 113/80 (91) 95 Room Air  











Physical Exam


General Appearance:  no apparent distress


Respiratory/Chest:  no respiratory distress, no accessory muscle use, + 

decreased breath sounds


Cardiovascular:  + irregularly irregular


Extremities:  + swelling (+1 pitting edema b/l LE; no warmth; no erythema), + 

pertinent finding


Neurologic/Psychiatric:  no motor/sensory deficits, alert, normal mood/affect





Laboratory Results





Last 24 Hours








Test


  8/3/18


16:45 8/3/18


21:06 8/4/18


05:38 8/4/18


07:13


 


Bedside Glucose 173 mg/dl  196 mg/dl   134 mg/dl 


 


White Blood Count   9.05 K/uL  


 


Red Blood Count   5.37 M/uL  


 


Hemoglobin   15.8 g/dL  


 


Hematocrit   47.4 %  


 


Mean Corpuscular Volume   88.3 fL  


 


Mean Corpuscular Hemoglobin   29.4 pg  


 


Mean Corpuscular Hemoglobin


Concent 


  


  33.3 g/dl 


  


 


 


RDW Standard Deviation   50.5 fL  


 


RDW Coefficient of Variation   15.9 %  


 


Platelet Count   152 K/uL  


 


Mean Platelet Volume   12.5 fL  


 


Sodium Level   135 mmol/L  


 


Potassium Level   3.8 mmol/L  


 


Chloride Level   100 mmol/L  


 


Carbon Dioxide Level   29 mmol/L  


 


Anion Gap   6.0 mmol/L  


 


Blood Urea Nitrogen   23 mg/dl  


 


Creatinine   0.87 mg/dl  


 


Est Creatinine Clear Calc


Drug Dose 


  


  102.2 ml/min 


  


 


 


Estimated GFR (


American) 


  


  102.8 


  


 


 


Estimated GFR (Non-


American 


  


  88.7 


  


 


 


BUN/Creatinine Ratio   26.1  


 


Random Glucose   124 mg/dl  


 


Calcium Level   9.0 mg/dl  


 


Test


  8/4/18


11:38 


  


  


 


 


Bedside Glucose 180 mg/dl    











Assessment and Plan


This is a 67 year old male with a past medical history of DM2, HTN, HLD, 

depression/anxiety, paroxysmal atrial fibrillation on long-term anticoagulation

, hypothyroidism - presents with nausea and shortness of breath





Likely Chronic Atrial Fibrillation with RVR


8/4


- metoprolol 37.5mg BID


- will likely change Pradaxa to Coumadin post-cath





8/3


- metoprolol tartrate 37.5mg BID


- Pradaxa for now - appreciate cardiology input; may need Coumadin due to 

valvular disease


- may need cardiac catheterization during this admission





8/2


- appreciate cardiology input


- Metoprolol was decreased today due to recent diagnosis of critically severe 

aortic stenosis


- continue Pradaxa





8/1


- patient with a hx. of atrial fibrillation, presented with rapid ventricular 

response


- initially started on a Cardizem drip; now back on metoprolol tartrate


- continue Pradaxa for anticoagulation


- appreciate cardiology input





Newly Diagnosed Critically Severe Aortic Stenosis


8/4


- continue IV Lasix, continue Aldactone


- will eventually need to have a cardiac cath





8/3


- currently on Lasix, and Aldactone started


- continue b-blocker


- may need to add ACE-I or ARB due to DM2





8/2


- continue with Lasix as per cardiology, will likely need diuretics on discharge





8/1


- holding IVFs, changing IV abx. to PO to prevent overload


- EF of around 50-55%


- will need outpatient aortic valve work-up; likely surgical repair at tertiary 

care center





Peripheral Vascular Disease


LLE Wound Ulcerations


8/2


- appreciate ID input


- doxycycline x 14 days


- outpatient wound care input





8/1


- arterial disease noted; L peroneal artery stenosis


- Dr. Ramírez consulted for vascular management


- once optimized, can d/c home


- changed IV abx. to Doxycycline


- ID consulted for further input, length of treatment, etc.





Mild Ileus


- spoke with GI, this likely does not represent ileus, though read as such on CT


- will give clears, stop IVFs, and advance diet as tolerated





DM2


- ha1c of 8.9%, not well controlled


- currently on 4 oral agents as outpatient


- will stop oral agents and insulin sliding scale with Lantus


- appreciate glycemic control consultation





Hypothyroidism


- continue Synthroid





DVT ppx


- Pradaxa





FULL CODE

## 2018-08-04 NOTE — PHARMACY PROGRESS NOTE
Pharmacy Glycemic Short Note 2


Date of Service


Aug 4, 2018.





OUTPATIENT ANTIDIABETIC REGIMEN: 


* Invokamet 150/500 mg BID


* Glimepiride 4 mg daily


* Sitagliptin 100 mg daily











Item Value  Date Time


 


Bedside Glucose 157 mg/dl H 8/1/18 1121


 


Bedside Glucose 130 mg/dl H 8/1/18 1706


 


Bedside Glucose 126 mg/dl H 8/1/18 2017


 


Bedside Glucose 111 mg/dl H 8/2/18 0744


 


Bedside Glucose 191 mg/dl H 8/2/18 1102


 


Bedside Glucose 150 mg/dl H 8/2/18 1621


 


Bedside Glucose 167 mg/dl H 8/2/18 2018














Bedside Glucose 115 mg/dl H 8/3/18 0724


 


Bedside Glucose 290 mg/dl H 8/3/18 1121


 


Bedside Glucose 173 mg/dl H 8/3/18 1645


 


Bedside Glucose 196 mg/dl H 8/3/18 2106


 


Bedside Glucose 134 mg/dl H 8/4/18 0713





























ASSESSMENT:


* Patient is currently receiving an average of ~55 units of insulin per day


 * 34 units of basal insulin


 * 21 units of prandial/correctional insulin but PO intake has been minimal but 

advancing





* Changes needed to insulin regimen: 


 * AM Fasting BSG = 115 mg/dl which is in goal range. No changes needed to 

basal insulin


 * Post-prandial BSGs are elevated/BSGs rise throughout the day therefore 

Tighten CF/CR














PLAN FOR INPATIENT GLYCEMIC CONTROL:


* Continue to hold outpatient oral diabetes medications





* Basal insulin: no change


 * Lantus 17 units SQ BID








* Bolus insulin - tighten CR for better post-prandial control 


 * NovoLog per scale ACHS or Q6hrs while NPO


 * Goal Range:  Low 110 mg/dL - High 150 mg/dL


 * Correction Factor:  20 mg/dL/unit


 * Nutritional / Prandial insulin per carb ratio of  1 unit per 7 grams CHO 

consumed








PLAN FOR DISCHARGE:


* Patient on 3+ oral agents without A1c at goal


* If tolerated, Invokamet dose could be increased (max is 150 mg/1000 mg BID).  

Another option would be to add a once daily dose of basal insulin - would 

recommend Lantus (or Basaglar) vs NPH depending on insulin insulin coverage. 

Recommend starting dosing at 15 units qHS and increase by 2 units every 3 days 

for AM BSG above 150 mg/dL.


* May be able to d/c sulfonylurea once basal insulin titrated as an outpatient

## 2018-08-05 VITALS
TEMPERATURE: 98.24 F | DIASTOLIC BLOOD PRESSURE: 84 MMHG | OXYGEN SATURATION: 97 % | SYSTOLIC BLOOD PRESSURE: 117 MMHG | HEART RATE: 91 BPM

## 2018-08-05 VITALS
OXYGEN SATURATION: 98 % | DIASTOLIC BLOOD PRESSURE: 84 MMHG | TEMPERATURE: 98.24 F | HEART RATE: 92 BPM | SYSTOLIC BLOOD PRESSURE: 120 MMHG

## 2018-08-05 VITALS
HEART RATE: 97 BPM | DIASTOLIC BLOOD PRESSURE: 84 MMHG | SYSTOLIC BLOOD PRESSURE: 121 MMHG | OXYGEN SATURATION: 95 % | TEMPERATURE: 97.88 F

## 2018-08-05 VITALS
SYSTOLIC BLOOD PRESSURE: 117 MMHG | DIASTOLIC BLOOD PRESSURE: 83 MMHG | HEART RATE: 91 BPM | OXYGEN SATURATION: 97 % | TEMPERATURE: 98.42 F

## 2018-08-05 VITALS
OXYGEN SATURATION: 98 % | TEMPERATURE: 97.7 F | DIASTOLIC BLOOD PRESSURE: 70 MMHG | HEART RATE: 75 BPM | SYSTOLIC BLOOD PRESSURE: 128 MMHG

## 2018-08-05 VITALS
TEMPERATURE: 98.24 F | DIASTOLIC BLOOD PRESSURE: 80 MMHG | OXYGEN SATURATION: 97 % | SYSTOLIC BLOOD PRESSURE: 126 MMHG | HEART RATE: 79 BPM

## 2018-08-05 VITALS
HEART RATE: 92 BPM | SYSTOLIC BLOOD PRESSURE: 112 MMHG | DIASTOLIC BLOOD PRESSURE: 51 MMHG | TEMPERATURE: 98.42 F | OXYGEN SATURATION: 97 %

## 2018-08-05 LAB
BUN SERPL-MCNC: 21 MG/DL (ref 7–18)
CALCIUM SERPL-MCNC: 9.1 MG/DL (ref 8.5–10.1)
CO2 SERPL-SCNC: 25 MMOL/L (ref 21–32)
CREAT SERPL-MCNC: 0.76 MG/DL (ref 0.6–1.4)
EOSINOPHIL NFR BLD AUTO: 176 K/UL (ref 130–400)
GLUCOSE SERPL-MCNC: 133 MG/DL (ref 70–99)
HCT VFR BLD CALC: 48.2 % (ref 42–52)
HGB BLD-MCNC: 16 G/DL (ref 14–18)
MCH RBC QN AUTO: 29.2 PG (ref 25–34)
MCHC RBC AUTO-ENTMCNC: 33.2 G/DL (ref 32–36)
MCV RBC AUTO: 88 FL (ref 80–100)
PMV BLD AUTO: 12.2 FL (ref 7.4–10.4)
POTASSIUM SERPL-SCNC: 3.6 MMOL/L (ref 3.5–5.1)
RED CELL DISTRIBUTION WIDTH CV: 15.7 % (ref 11.5–14.5)
RED CELL DISTRIBUTION WIDTH SD: 50 FL (ref 36.4–46.3)
SODIUM SERPL-SCNC: 137 MMOL/L (ref 136–145)
WBC # BLD AUTO: 10.27 K/UL (ref 4.8–10.8)

## 2018-08-05 RX ADMIN — INSULIN ASPART SCH UNITS: 100 INJECTION, SOLUTION INTRAVENOUS; SUBCUTANEOUS at 20:44

## 2018-08-05 RX ADMIN — BUPROPION HYDROCHLORIDE SCH MG: 150 TABLET, EXTENDED RELEASE ORAL at 07:37

## 2018-08-05 RX ADMIN — METOPROLOL TARTRATE SCH MG: 25 TABLET, FILM COATED ORAL at 20:42

## 2018-08-05 RX ADMIN — CALCIUM CARBONATE PRN MG: 500 TABLET ORAL at 07:37

## 2018-08-05 RX ADMIN — GEMFIBROZIL SCH MG: 600 TABLET ORAL at 07:35

## 2018-08-05 RX ADMIN — LORAZEPAM PRN MG: 0.5 TABLET ORAL at 23:05

## 2018-08-05 RX ADMIN — SPIRONOLACTONE SCH MG: 25 TABLET, FILM COATED ORAL at 07:38

## 2018-08-05 RX ADMIN — INSULIN ASPART SCH UNITS: 100 INJECTION, SOLUTION INTRAVENOUS; SUBCUTANEOUS at 12:00

## 2018-08-05 RX ADMIN — METOPROLOL TARTRATE SCH MG: 25 TABLET, FILM COATED ORAL at 07:36

## 2018-08-05 RX ADMIN — FUROSEMIDE SCH MLS/MIN: 10 INJECTION, SOLUTION INTRAMUSCULAR; INTRAVENOUS at 09:08

## 2018-08-05 RX ADMIN — INSULIN ASPART SCH UNITS: 100 INJECTION, SOLUTION INTRAVENOUS; SUBCUTANEOUS at 07:51

## 2018-08-05 RX ADMIN — LEVOTHYROXINE SODIUM SCH MCG: 50 TABLET ORAL at 06:05

## 2018-08-05 RX ADMIN — INSULIN ASPART SCH UNITS: 100 INJECTION, SOLUTION INTRAVENOUS; SUBCUTANEOUS at 16:57

## 2018-08-05 RX ADMIN — INSULIN GLARGINE SCH UNITS: 100 INJECTION, SOLUTION SUBCUTANEOUS at 07:52

## 2018-08-05 RX ADMIN — INSULIN GLARGINE SCH UNITS: 100 INJECTION, SOLUTION SUBCUTANEOUS at 20:45

## 2018-08-05 RX ADMIN — DABIGATRAN ETEXILATE MESYLATE SCH MG: 75 CAPSULE ORAL at 07:39

## 2018-08-05 RX ADMIN — DABIGATRAN ETEXILATE MESYLATE SCH MG: 75 CAPSULE ORAL at 20:43

## 2018-08-05 RX ADMIN — GEMFIBROZIL SCH MG: 600 TABLET ORAL at 20:42

## 2018-08-05 RX ADMIN — ALLOPURINOL SCH MG: 100 TABLET ORAL at 07:37

## 2018-08-05 RX ADMIN — BUPROPION HYDROCHLORIDE SCH MG: 150 TABLET, EXTENDED RELEASE ORAL at 20:42

## 2018-08-05 NOTE — PROGRESS NOTE
Internal Med Progress Note


Date of Service:


Aug 5, 2018.


Provider Documentation:





SUBJECTIVE:





The patient was seen and examined in telemetry unit


He has significant medical history including atrial fibrillation and severe 

aortic stenosis


He was admitted with them left leg cellulitis with CHF and severe aortic 

stenosis


Feels a lot better and denies any symptoms


Leg swelling has improved a lot


He is waiting for cardiac cath








OBJECTIVE:





Vital Signs-as noted below





Exam:


General-no apparent distress


Eyes-normal


ENT-normal


Neck-supple


Lungs-clear to auscultate bilaterally with minimal crackles at the bases


Heart-irregular, soft aortic sound, difficult to feel for any murmur


Abdomen-benign, soft, nontender


Extremities-chronic skin changes with chronic edema bilaterally


No evidence of cellulitis in left leg and no ulceration


2 healed ulcerated area over the shin


Neuro-alert, awake and oriented 3


No focal neuro deficit appreciated





Lab data as noted below.


ASSESSMENT & PLAN:








This is a 67 year old male with a past medical history of DM2, HTN, HLD, 

depression/anxiety, paroxysmal atrial fibrillation on long-term anticoagulation

, hypothyroidism - presents with nausea and shortness of breath





Likely Chronic Atrial Fibrillation with RVR


- patient with a hx. of atrial fibrillation, presented with rapid ventricular 

response


- initially started on a Cardizem drip; now back on metoprolol tartrate


-Continue Pradaxa for now and adjust as needed for any procedure


- appreciate cardiology input


-Clinically a lot better


-Denies any symptoms


-Waiting for cardiac cath








Newly Diagnosed Critically Severe Aortic Stenosis


-Containing strict intake output 


- EF of around 50-55%


- will need outpatient aortic valve work-up; likely surgical repair at tertiary 

care center


-We will go for cardiac cath in a day or 2, preop evaluation for possible 

aortic valve replacement








Decompensated diastolic heart failure in setting of rapid atrial fibrillation 

and severe aortic stenosis


Receiving IV Lasix initially with good results


Leg swelling is much better


We will continue the same for now





Peripheral Vascular Disease


LLE Wound Ulcerations


- arterial disease noted; L peroneal artery stenosis


- Dr. Ramírez consulted for vascular management


- once optimized, can d/c home


- changed IV abx. to Doxycycline


- ID consulted for further input, antibiotic for 14 days in total








Mild Ileus


- spoke with GI, this likely does not represent ileus, though read as such on CT


- will give clears, stop IVFs, and advance diet as tolerated





DM2


- ha1c of 8.9%, not well controlled


- currently on 4 oral agents as outpatient


- will stop oral agents and insulin sliding scale with Lantus


- appreciate glycemic control consultation





Hypothyroidism


- continue Synthroid





DVT ppx


- Pradaxa





FULL CODE





Vital Signs:











  Date Time  Temp Pulse Resp B/P (MAP) Pulse Ox O2 Delivery O2 Flow Rate FiO2


 


8/5/18 11:01 36.8 79 20 126/80 (95) 97 Room Air  


 


8/5/18 08:00      Room Air  


 


8/5/18 07:19 36.6 97 20 121/84 (96) 95 Room Air  


 


8/5/18 06:55 36.9 92 18 112/51 (71) 97 Room Air  


 


8/5/18 03:08 36.9 91 18 117/83 (94) 97 Room Air  


 


8/5/18 00:05      Room Air  


 


8/4/18 23:40 36.6 100 18 119/74 (89) 95 Room Air  


 


8/4/18 20:00      Room Air  


 


8/4/18 19:15 37.3 94 20 130/85 (100) 94 Room Air  


 


8/4/18 15:18 36.9 96 18 114/70 (85) 94 Room Air  








Lab Results:





Results Past 24 Hours








Test


  8/4/18


16:18 8/4/18


20:28 8/5/18


05:30 8/5/18


07:33 Range/Units


 


 


Bedside Glucose 156 172  138 70-99  mg/dl


 


White Blood Count   10.27  4.8-10.8  K/uL


 


Red Blood Count   5.48  4.7-6.1  M/uL


 


Hemoglobin   16.0  14.0-18.0  g/dL


 


Hematocrit   48.2  42-52  %


 


Mean Corpuscular Volume   88.0    fL


 


Mean Corpuscular Hemoglobin   29.2  25-34  pg


 


Mean Corpuscular Hemoglobin


Concent 


  


  33.2


  


  32-36  g/dl


 


 


RDW Standard Deviation   50.0  36.4-46.3  fL


 


RDW Coefficient of Variation   15.7  11.5-14.5  %


 


Platelet Count   176  130-400  K/uL


 


Mean Platelet Volume   12.2  7.4-10.4  fL


 


Sodium Level   137  136-145  mmol/L


 


Potassium Level   3.6  3.5-5.1  mmol/L


 


Chloride Level   102    mmol/L


 


Carbon Dioxide Level   25  21-32  mmol/L


 


Anion Gap   10.0  3-11  mmol/L


 


Blood Urea Nitrogen   21  7-18  mg/dl


 


Creatinine


  


  


  0.76


  


  0.60-1.40


mg/dl


 


Est Creatinine Clear Calc


Drug Dose 


  


  116.5


  


   ml/min


 


 


Estimated GFR (


American) 


  


  108.7


  


   


 


 


Estimated GFR (Non-


American 


  


  93.7


  


   


 


 


BUN/Creatinine Ratio   27.1  10-20  


 


Random Glucose   133  70-99  mg/dl


 


Calcium Level   9.1  8.5-10.1  mg/dl


 


Magnesium Level   2.2  1.8-2.4  mg/dl


 


Test


  8/5/18


10:59 


  


  


  Range/Units


 


 


Bedside Glucose 169    70-99  mg/dl

## 2018-08-05 NOTE — CARDIOLOGY FOLLOW-UP
Subjective


General


Date of Service:


Aug 5, 2018.


Chief Complaint:  Follow-up atrial fibrillation, edema, aortic stenosis


Pt evaluation today including:  conversation w/ patient, physical exam, chart 

review, lab review, review of studies, review of inpatient medication list





History of Present Illness


The patient is a 67 year old male seen in follow-up.  Edema improving.  Notes 

shortness of breath with exertion.  No orthopnea or PND.  Denies chest 

discomfort.  Telemetry demonstrates atrial fibrillation averaging approximately 

 bpm.  Denies palpitations.  Fluid balance negative approximately 2.2 L 

over the past 24 hours.  Creatinine within normal limits and trending downward 

slightly.  Tolerating diet and medications.  Offers no complaints at this time.





Allergies


Coded Allergies:  


     No Known Allergies (Unverified , 7/31/18)





Social History


Smoking Status:  Former Smoker


Hx Tobacco Use In Past Year?:  No


Hx Alcohol Use - Type And Amou:  No


Hx Substance Use - Type And Am:  No





Problem List


Medical Problems:


(1) Atrial fibrillation with rapid ventricular response


Status: Acute  





(2) Ileus


Status: Acute  





(3) Nausea


Status: Acute  











Review of Systems


Respiratory:  + dyspnea on exertion, No cough, No sputum, No wheezing, No 

shortness of breath, No dyspnea at rest, No hemoptysis


Cardiac:  + edema, No chest pain, No orthopnea, No PND, No claudication, No 

palpitations





Physical Exam


Vital Signs





Last Vital Signs Documentation








  Date Time  Temp Pulse Resp B/P (MAP) Pulse Ox O2 Delivery O2 Flow Rate FiO2


 


8/5/18 08:00      Room Air  


 


8/5/18 07:19 36.6 97 20 121/84 (96) 95   


 


8/4/18 08:00       2.0 











Physical Exam


Constitutional:  


   Level of Distress:  chronically ill


Head:  normocephalic


Neck:  supple


Lungs:  


   Auscultation:  no wheezing, no rhonchi, pertinent finding (Decreased breath 

sounds at the bases with scant crackles)


Cardiovascular:  


   Heart Auscultation:  irregular rate rhythm, pertinent finding (The patient's 

murmur is very faint and minimally audible)


Abdomen:  


   Inspection & Palpation:  soft, non-distended


Extremities:  pertinent finding (Venous stasis changes, 1+ bilateral pretibial 

edema with stasis changes.)


Neurologic:  


   Gait & Station:  pertinent finding (No focal deficits)





Assessment and Plan


Assessment and Plan


Final impression


1.  Severe aortic stenosis


2.  Decompensated diastolic heart failure in setting of rapid atrial 

fibrillation and severe aortic stenosis


2.  Chronic atrial fibrillation with borderline rate control


3.  Lower extremity edema, left lower extremity superficial ulcerations





Discussion/recommendations:


Continue intravenous diuretic therapy.


Continue current dose of beta-blocker therapy and monitor telemetry.


Hold Pradaxa in a.m. pending clinical assessment with tentative plans for 

cardiac catheterization by mid week.


Repeat basic metabolic panel in a.m.


Will likely transition patient from Pradaxa to Coumadin post catheterization.





Laboratory Results





Last 24 Hours








Test


  8/4/18


11:38 8/4/18


16:18 8/4/18


20:28 8/5/18


05:30


 


Bedside Glucose 180 mg/dl  156 mg/dl  172 mg/dl  


 


White Blood Count    10.27 K/uL 


 


Red Blood Count    5.48 M/uL 


 


Hemoglobin    16.0 g/dL 


 


Hematocrit    48.2 % 


 


Mean Corpuscular Volume    88.0 fL 


 


Mean Corpuscular Hemoglobin    29.2 pg 


 


Mean Corpuscular Hemoglobin


Concent 


  


  


  33.2 g/dl 


 


 


RDW Standard Deviation    50.0 fL 


 


RDW Coefficient of Variation    15.7 % 


 


Platelet Count    176 K/uL 


 


Mean Platelet Volume    12.2 fL 


 


Sodium Level    137 mmol/L 


 


Potassium Level    3.6 mmol/L 


 


Chloride Level    102 mmol/L 


 


Carbon Dioxide Level    25 mmol/L 


 


Anion Gap    10.0 mmol/L 


 


Blood Urea Nitrogen    21 mg/dl 


 


Creatinine    0.76 mg/dl 


 


Est Creatinine Clear Calc


Drug Dose 


  


  


  116.5 ml/min 


 


 


Estimated GFR (


American) 


  


  


  108.7 


 


 


Estimated GFR (Non-


American 


  


  


  93.7 


 


 


BUN/Creatinine Ratio    27.1 


 


Random Glucose    133 mg/dl 


 


Calcium Level    9.1 mg/dl 


 


Magnesium Level    2.2 mg/dl 


 


Test


  8/5/18


07:33 


  


  


 


 


Bedside Glucose 138 mg/dl

## 2018-08-06 VITALS
HEART RATE: 86 BPM | OXYGEN SATURATION: 97 % | TEMPERATURE: 98.42 F | DIASTOLIC BLOOD PRESSURE: 73 MMHG | SYSTOLIC BLOOD PRESSURE: 116 MMHG

## 2018-08-06 VITALS
SYSTOLIC BLOOD PRESSURE: 126 MMHG | TEMPERATURE: 97.88 F | DIASTOLIC BLOOD PRESSURE: 79 MMHG | OXYGEN SATURATION: 98 % | HEART RATE: 79 BPM

## 2018-08-06 VITALS
TEMPERATURE: 98.6 F | SYSTOLIC BLOOD PRESSURE: 115 MMHG | DIASTOLIC BLOOD PRESSURE: 77 MMHG | HEART RATE: 78 BPM | OXYGEN SATURATION: 97 %

## 2018-08-06 VITALS
DIASTOLIC BLOOD PRESSURE: 82 MMHG | SYSTOLIC BLOOD PRESSURE: 139 MMHG | TEMPERATURE: 97.88 F | HEART RATE: 89 BPM | OXYGEN SATURATION: 98 %

## 2018-08-06 VITALS
TEMPERATURE: 97.88 F | SYSTOLIC BLOOD PRESSURE: 131 MMHG | HEART RATE: 85 BPM | DIASTOLIC BLOOD PRESSURE: 84 MMHG | OXYGEN SATURATION: 98 %

## 2018-08-06 VITALS
TEMPERATURE: 97.7 F | HEART RATE: 99 BPM | OXYGEN SATURATION: 98 % | DIASTOLIC BLOOD PRESSURE: 79 MMHG | SYSTOLIC BLOOD PRESSURE: 121 MMHG

## 2018-08-06 LAB
BUN SERPL-MCNC: 17 MG/DL (ref 7–18)
CALCIUM SERPL-MCNC: 9.2 MG/DL (ref 8.5–10.1)
CO2 SERPL-SCNC: 27 MMOL/L (ref 21–32)
CREAT SERPL-MCNC: 0.76 MG/DL (ref 0.6–1.4)
EOSINOPHIL NFR BLD AUTO: 189 K/UL (ref 130–400)
GLUCOSE SERPL-MCNC: 128 MG/DL (ref 70–99)
HCT VFR BLD CALC: 46.6 % (ref 42–52)
HGB BLD-MCNC: 15.7 G/DL (ref 14–18)
MCH RBC QN AUTO: 29.7 PG (ref 25–34)
MCHC RBC AUTO-ENTMCNC: 33.7 G/DL (ref 32–36)
MCV RBC AUTO: 88.3 FL (ref 80–100)
PHOSPHATE SERPL-MCNC: 3.1 MG/DL (ref 2.5–4.9)
PMV BLD AUTO: 12 FL (ref 7.4–10.4)
POTASSIUM SERPL-SCNC: 3.8 MMOL/L (ref 3.5–5.1)
RED CELL DISTRIBUTION WIDTH CV: 15.9 % (ref 11.5–14.5)
RED CELL DISTRIBUTION WIDTH SD: 51.1 FL (ref 36.4–46.3)
SODIUM SERPL-SCNC: 139 MMOL/L (ref 136–145)
WBC # BLD AUTO: 9.81 K/UL (ref 4.8–10.8)

## 2018-08-06 RX ADMIN — ALLOPURINOL SCH MG: 100 TABLET ORAL at 08:07

## 2018-08-06 RX ADMIN — METOPROLOL TARTRATE SCH MG: 25 TABLET, FILM COATED ORAL at 08:08

## 2018-08-06 RX ADMIN — DOXYCYCLINE HYCLATE SCH MG: 100 CAPSULE, GELATIN COATED ORAL at 19:37

## 2018-08-06 RX ADMIN — LEVOTHYROXINE SODIUM SCH MCG: 50 TABLET ORAL at 06:30

## 2018-08-06 RX ADMIN — SPIRONOLACTONE SCH MG: 25 TABLET, FILM COATED ORAL at 08:08

## 2018-08-06 RX ADMIN — GEMFIBROZIL SCH MG: 600 TABLET ORAL at 08:08

## 2018-08-06 RX ADMIN — METOPROLOL TARTRATE SCH MG: 25 TABLET, FILM COATED ORAL at 19:36

## 2018-08-06 RX ADMIN — BUPROPION HYDROCHLORIDE SCH MG: 150 TABLET, EXTENDED RELEASE ORAL at 08:07

## 2018-08-06 RX ADMIN — FUROSEMIDE SCH MLS/MIN: 10 INJECTION, SOLUTION INTRAMUSCULAR; INTRAVENOUS at 08:07

## 2018-08-06 RX ADMIN — INSULIN ASPART SCH UNITS: 100 INJECTION, SOLUTION INTRAVENOUS; SUBCUTANEOUS at 12:07

## 2018-08-06 RX ADMIN — INSULIN ASPART SCH UNITS: 100 INJECTION, SOLUTION INTRAVENOUS; SUBCUTANEOUS at 17:09

## 2018-08-06 RX ADMIN — INSULIN ASPART SCH UNITS: 100 INJECTION, SOLUTION INTRAVENOUS; SUBCUTANEOUS at 20:42

## 2018-08-06 RX ADMIN — INSULIN GLARGINE SCH UNITS: 100 INJECTION, SOLUTION SUBCUTANEOUS at 08:11

## 2018-08-06 RX ADMIN — BUPROPION HYDROCHLORIDE SCH MG: 150 TABLET, EXTENDED RELEASE ORAL at 19:35

## 2018-08-06 RX ADMIN — DABIGATRAN ETEXILATE MESYLATE SCH MG: 75 CAPSULE ORAL at 10:23

## 2018-08-06 RX ADMIN — GEMFIBROZIL SCH MG: 600 TABLET ORAL at 19:35

## 2018-08-06 RX ADMIN — INSULIN ASPART SCH UNITS: 100 INJECTION, SOLUTION INTRAVENOUS; SUBCUTANEOUS at 08:11

## 2018-08-06 RX ADMIN — INSULIN GLARGINE SCH UNITS: 100 INJECTION, SOLUTION SUBCUTANEOUS at 20:46

## 2018-08-06 NOTE — CARDIOLOGY FOLLOW-UP
Subjective


General


Date of Service:


Aug 6, 2018.


Chief Complaint:  Follow-up atrial fibrillation, edema, aortic stenosis


Pt evaluation today including:  conversation w/ patient, physical exam, chart 

review, lab review, review of studies, conversation w/ consultant, review of 

inpatient medication list





History of Present Illness


The patient is a 67 year old male seen in follow-up.  Respiratory status 

improving.  Atrial fibrillation with controlled ventricular response on 

telemetry.  Fluid balance negative an additional 2 L.  Renal function remained 

stable.  Denies chest discomfort or heaviness.  No orthopnea or PND.  

Requesting removal of Martin catheter.





Allergies


Coded Allergies:  


     No Known Allergies (Unverified , 7/31/18)





Social History


Smoking Status:  Former Smoker


Hx Tobacco Use In Past Year?:  No


Hx Alcohol Use - Type And Amou:  No


Hx Substance Use - Type And Am:  No





Problem List


Medical Problems:


(1) Atrial fibrillation with rapid ventricular response


Status: Acute  





(2) Ileus


Status: Acute  





(3) Nausea


Status: Acute  











Review of Systems


Respiratory:  + shortness of breath, + dyspnea on exertion, No cough, No sputum

, No wheezing, No dyspnea at rest, No hemoptysis


Cardiac:  + edema, No chest pain, No orthopnea, No PND, No claudication, No 

palpitations





Physical Exam


Vital Signs





Last Vital Signs Documentation








  Date Time  Temp Pulse Resp B/P (MAP) Pulse Ox O2 Delivery O2 Flow Rate FiO2


 


8/6/18 07:39 36.6 89 20 139/82 (101) 98 Room Air  


 


8/4/18 08:00       2.0 











Physical Exam


Constitutional:  


   Level of Distress:  chronically ill


Head:  normocephalic


Neck:  supple


Lungs:  


   Auscultation:  no wheezing, no rhonchi, pertinent finding (Decreased breath 

sounds at the bases with scant crackles)


Cardiovascular:  


   Heart Auscultation:  irregular rate rhythm, pertinent finding (The patient's 

murmur is very faint and minimally audible)


Abdomen:  


   Inspection & Palpation:  soft, non-distended


Extremities:  pertinent finding (Venous stasis changes, 1+ bilateral pretibial 

edema with stasis changes.)


Neurologic:  


   Gait & Station:  pertinent finding (No focal deficits)





Assessment and Plan


Assessment and Plan


Final impression


1.  Severe aortic stenosis


2.  Decompensated diastolic heart failure in setting of rapid atrial 

fibrillation and severe aortic stenosis


2.  Chronic atrial fibrillation -rate controlled and chronically anticoagulated 

with Pradaxa


3.  Lower extremity edema, left lower extremity superficial ulcerations





Discussion/recommendations:


Continue intravenous diuretic therapy.


Discontinue Martin catheter.


Continue current dose of beta-blocker therapy and monitor telemetry.


Hold Pradaxa in a.m. pending clinical assessment with tentative plans for 

cardiac catheterization by mid week.


Repeat basic metabolic panel in a.m.


Will likely transition patient from Pradaxa to Coumadin post catheterization.





Laboratory Results





Last 24 Hours








Test


  8/5/18


10:59 8/5/18


16:03 8/5/18


20:04 8/6/18


06:47


 


Bedside Glucose 169 mg/dl  200 mg/dl  157 mg/dl  


 


White Blood Count    9.81 K/uL 


 


Red Blood Count    5.28 M/uL 


 


Hemoglobin    15.7 g/dL 


 


Hematocrit    46.6 % 


 


Mean Corpuscular Volume    88.3 fL 


 


Mean Corpuscular Hemoglobin    29.7 pg 


 


Mean Corpuscular Hemoglobin


Concent 


  


  


  33.7 g/dl 


 


 


RDW Standard Deviation    51.1 fL 


 


RDW Coefficient of Variation    15.9 % 


 


Platelet Count    189 K/uL 


 


Mean Platelet Volume    12.0 fL 


 


Sodium Level    139 mmol/L 


 


Potassium Level    3.8 mmol/L 


 


Chloride Level    103 mmol/L 


 


Carbon Dioxide Level    27 mmol/L 


 


Anion Gap    8.0 mmol/L 


 


Blood Urea Nitrogen    17 mg/dl 


 


Creatinine    0.76 mg/dl 


 


Est Creatinine Clear Calc


Drug Dose 


  


  


  116.7 ml/min 


 


 


Estimated GFR (


American) 


  


  


  108.7 


 


 


Estimated GFR (Non-


American 


  


  


  93.7 


 


 


BUN/Creatinine Ratio    22.5 


 


Random Glucose    128 mg/dl 


 


Calcium Level    9.2 mg/dl 


 


Phosphorus Level    3.1 mg/dl 


 


Magnesium Level    2.1 mg/dl 


 


Test


  8/6/18


07:15 


  


  


 


 


Bedside Glucose 135 mg/dl

## 2018-08-06 NOTE — PROGRESS NOTE
Internal Med Progress Note


Date of Service:


Aug 6, 2018.


Provider Documentation:





SUBJECTIVE:





The patient was seen and examined in telemetry unit


He has significant medical history including atrial fibrillation and severe 

aortic stenosis


He was admitted with them left leg cellulitis with CHF and severe aortic 

stenosis


Feels a lot better and denies any symptoms


Leg swelling has improved a lot


He is waiting for cardiac cath


8/6: Denies any symptoms today except some increased irritation involving the 

catheter clinically a lot better


With much improved leg swelling


Left leg wound is better with minimal drainage but no cellulitis








OBJECTIVE:





Vital Signs-as noted below





Exam:


General-no apparent distress


Eyes-normal


ENT-normal


Neck-supple


Lungs-clear to auscultate bilaterally with minimal crackles at the bases


Heart-irregular, soft aortic sound, difficult to feel for any murmur


Abdomen-benign, soft, nontender


Extremities-chronic skin changes with chronic edema bilaterally


No evidence of cellulitis in left leg and no ulceration


2 healed ulcerated area over the shin


Neuro-alert, awake and oriented 3


No focal neuro deficit appreciated





Lab data as noted below.


ASSESSMENT & PLAN:








This is a 67 year old male with a past medical history of DM2, HTN, HLD, 

depression/anxiety, paroxysmal atrial fibrillation on long-term anticoagulation

, hypothyroidism - presents with nausea and shortness of breath





Likely Chronic Atrial Fibrillation with RVR


- patient with a hx. of atrial fibrillation, presented with rapid ventricular 

response


- initially started on a Cardizem drip; now back on metoprolol tartrate


-Continue Pradaxa for now and adjust as needed for any procedure


- appreciate cardiology input


-Clinically a lot better, denies any symptoms


-Waiting for cardiac cath-tomorrow








Newly Diagnosed Critically Severe Aortic Stenosis


-Containing strict intake output 


- EF of around 50-55%


- will need outpatient aortic valve work-up; likely surgical repair at tertiary 

care center


-We will go for cardiac cath in a day or 2, preop evaluation for possible 

aortic valve replacement


-Cardiac cath tomorrow








Decompensated diastolic heart failure in setting of rapid atrial fibrillation 

and severe aortic stenosis


Receiving IV Lasix initially with good results


Leg swelling is much better


We will continue the same for now


Has been diuresing quite well





Peripheral Vascular Disease


LLE Wound Ulcerations


- arterial disease noted; L peroneal artery stenosis


- Dr. Ramírez consulted for vascular management


- once optimized, can d/c home


- changed IV abx. to Doxycycline


- ID consulted for further input, antibiotic for 14 days in total


-Restarted doxycycline and to continue 14 days of antibiotic and total


-Left leg wound is much better








Mild Ileus


- spoke with GI, this likely does not represent ileus, though read as such on CT


- will give clears, stop IVFs, and advance diet as tolerated


-No acute issue





DM2


- ha1c of 8.9%, not well controlled


- currently on 4 oral agents as outpatient


- will stop oral agents and insulin sliding scale with Lantus


- appreciate glycemic control consultation





Hypothyroidism


- continue Synthroid





DVT ppx


- Pradaxa





FULL CODE





Vital Signs:











  Date Time  Temp Pulse Resp B/P (MAP) Pulse Ox O2 Delivery O2 Flow Rate FiO2


 


8/6/18 11:40 36.6 85 19 131/84 (100) 98 Room Air  


 


8/6/18 08:00      Room Air  


 


8/6/18 07:39 36.6 89 20 139/82 (101) 98 Room Air  


 


8/6/18 04:08 36.5 99 20 121/79 (93) 98 Room Air  


 


8/5/18 23:41 36.5 75 19 128/70 (89) 98 Room Air  


 


8/5/18 20:00      Room Air  


 


8/5/18 19:03 36.8 92 18 120/84 (96) 98   


 


8/5/18 16:00      Room Air  


 


8/5/18 15:10 36.8 91 18 117/84 (95) 97   








Lab Results:





Results Past 24 Hours








Test


  8/5/18


16:03 8/5/18


20:04 8/6/18


06:47 8/6/18


07:15 Range/Units


 


 


Bedside Glucose 200 157  135 70-99  mg/dl


 


White Blood Count   9.81  4.8-10.8  K/uL


 


Red Blood Count   5.28  4.7-6.1  M/uL


 


Hemoglobin   15.7  14.0-18.0  g/dL


 


Hematocrit   46.6  42-52  %


 


Mean Corpuscular Volume   88.3    fL


 


Mean Corpuscular Hemoglobin   29.7  25-34  pg


 


Mean Corpuscular Hemoglobin


Concent 


  


  33.7


  


  32-36  g/dl


 


 


RDW Standard Deviation   51.1  36.4-46.3  fL


 


RDW Coefficient of Variation   15.9  11.5-14.5  %


 


Platelet Count   189  130-400  K/uL


 


Mean Platelet Volume   12.0  7.4-10.4  fL


 


Sodium Level   139  136-145  mmol/L


 


Potassium Level   3.8  3.5-5.1  mmol/L


 


Chloride Level   103    mmol/L


 


Carbon Dioxide Level   27  21-32  mmol/L


 


Anion Gap   8.0  3-11  mmol/L


 


Blood Urea Nitrogen   17  7-18  mg/dl


 


Creatinine


  


  


  0.76


  


  0.60-1.40


mg/dl


 


Est Creatinine Clear Calc


Drug Dose 


  


  116.7


  


   ml/min


 


 


Estimated GFR (


American) 


  


  108.7


  


   


 


 


Estimated GFR (Non-


American 


  


  93.7


  


   


 


 


BUN/Creatinine Ratio   22.5  10-20  


 


Random Glucose   128  70-99  mg/dl


 


Calcium Level   9.2  8.5-10.1  mg/dl


 


Phosphorus Level   3.1  2.5-4.9  mg/dl


 


Magnesium Level   2.1  1.8-2.4  mg/dl

## 2018-08-07 VITALS
SYSTOLIC BLOOD PRESSURE: 114 MMHG | HEART RATE: 79 BPM | DIASTOLIC BLOOD PRESSURE: 70 MMHG | OXYGEN SATURATION: 98 % | TEMPERATURE: 98.78 F

## 2018-08-07 VITALS
TEMPERATURE: 98.06 F | DIASTOLIC BLOOD PRESSURE: 84 MMHG | SYSTOLIC BLOOD PRESSURE: 134 MMHG | OXYGEN SATURATION: 98 % | HEART RATE: 68 BPM

## 2018-08-07 VITALS
HEART RATE: 78 BPM | OXYGEN SATURATION: 93 % | SYSTOLIC BLOOD PRESSURE: 100 MMHG | TEMPERATURE: 98.06 F | DIASTOLIC BLOOD PRESSURE: 62 MMHG

## 2018-08-07 VITALS
HEART RATE: 76 BPM | TEMPERATURE: 98.42 F | DIASTOLIC BLOOD PRESSURE: 51 MMHG | OXYGEN SATURATION: 98 % | SYSTOLIC BLOOD PRESSURE: 113 MMHG

## 2018-08-07 VITALS
DIASTOLIC BLOOD PRESSURE: 76 MMHG | OXYGEN SATURATION: 97 % | HEART RATE: 78 BPM | TEMPERATURE: 98.06 F | SYSTOLIC BLOOD PRESSURE: 114 MMHG

## 2018-08-07 VITALS — OXYGEN SATURATION: 97 %

## 2018-08-07 VITALS — OXYGEN SATURATION: 93 %

## 2018-08-07 LAB
BUN SERPL-MCNC: 19 MG/DL (ref 7–18)
CALCIUM SERPL-MCNC: 9.3 MG/DL (ref 8.5–10.1)
CO2 SERPL-SCNC: 24 MMOL/L (ref 21–32)
CREAT SERPL-MCNC: 0.89 MG/DL (ref 0.6–1.4)
GLUCOSE SERPL-MCNC: 211 MG/DL (ref 70–99)
POTASSIUM SERPL-SCNC: 4.1 MMOL/L (ref 3.5–5.1)
SODIUM SERPL-SCNC: 137 MMOL/L (ref 136–145)

## 2018-08-07 RX ADMIN — INSULIN GLARGINE SCH UNITS: 100 INJECTION, SOLUTION SUBCUTANEOUS at 07:28

## 2018-08-07 RX ADMIN — GEMFIBROZIL SCH MG: 600 TABLET ORAL at 07:24

## 2018-08-07 RX ADMIN — INSULIN ASPART SCH UNITS: 100 INJECTION, SOLUTION INTRAVENOUS; SUBCUTANEOUS at 13:02

## 2018-08-07 RX ADMIN — DOXYCYCLINE HYCLATE SCH MG: 100 CAPSULE, GELATIN COATED ORAL at 07:24

## 2018-08-07 RX ADMIN — INSULIN ASPART SCH UNITS: 100 INJECTION, SOLUTION INTRAVENOUS; SUBCUTANEOUS at 08:02

## 2018-08-07 RX ADMIN — METOPROLOL TARTRATE SCH MG: 25 TABLET, FILM COATED ORAL at 21:12

## 2018-08-07 RX ADMIN — SPIRONOLACTONE SCH MG: 25 TABLET, FILM COATED ORAL at 07:24

## 2018-08-07 RX ADMIN — INSULIN GLARGINE SCH UNITS: 100 INJECTION, SOLUTION SUBCUTANEOUS at 21:16

## 2018-08-07 RX ADMIN — ALLOPURINOL SCH MG: 100 TABLET ORAL at 07:22

## 2018-08-07 RX ADMIN — CALCIUM CARBONATE PRN MG: 500 TABLET ORAL at 23:16

## 2018-08-07 RX ADMIN — INSULIN ASPART SCH UNITS: 100 INJECTION, SOLUTION INTRAVENOUS; SUBCUTANEOUS at 17:12

## 2018-08-07 RX ADMIN — BUPROPION HYDROCHLORIDE SCH MG: 150 TABLET, EXTENDED RELEASE ORAL at 21:10

## 2018-08-07 RX ADMIN — BUPROPION HYDROCHLORIDE SCH MG: 150 TABLET, EXTENDED RELEASE ORAL at 07:23

## 2018-08-07 RX ADMIN — GEMFIBROZIL SCH MG: 600 TABLET ORAL at 21:11

## 2018-08-07 RX ADMIN — INSULIN ASPART SCH UNITS: 100 INJECTION, SOLUTION INTRAVENOUS; SUBCUTANEOUS at 21:15

## 2018-08-07 RX ADMIN — LEVOTHYROXINE SODIUM SCH MCG: 50 TABLET ORAL at 07:05

## 2018-08-07 RX ADMIN — FUROSEMIDE SCH MLS/MIN: 10 INJECTION, SOLUTION INTRAMUSCULAR; INTRAVENOUS at 07:28

## 2018-08-07 RX ADMIN — DOXYCYCLINE HYCLATE SCH MG: 100 CAPSULE, GELATIN COATED ORAL at 21:12

## 2018-08-07 RX ADMIN — METOPROLOL TARTRATE SCH MG: 25 TABLET, FILM COATED ORAL at 07:22

## 2018-08-07 NOTE — PHARMACY PROGRESS NOTE
Pharmacy Glycemic Short Note 2


Date of Service


Aug 7, 2018.





OUTPATIENT ANTIDIABETIC REGIMEN: 


* Invokamet 150/500 mg BID


* Glimepiride 4 mg daily


* Sitagliptin 100 mg daily


* HbA1c:  8.9%  (8/1/18)








ASSESSMENT:


* Patient is currently receiving an average of ~55 units of insulin per day


 * 34 units of basal insulin


 * ~20 units of prandial/correctional insulin


 * Pt will be NPO after midnight for cath tomorrow





* Changes needed to insulin regimen: 


 * no changes required at this time (anticipate that temporary NPO status will 

not require changes)














PLAN FOR INPATIENT GLYCEMIC CONTROL:


* Continue to hold outpatient oral diabetes medications





* Basal insulin: no change


 * Lantus 17 units SQ BID








* Bolus insulin - no change


 * NovoLog per scale ACHS or Q6hrs while NPO


 * Goal Range:  Low 110 mg/dL - High 150 mg/dL


 * Correction Factor:  20 mg/dL/unit


 * Nutritional / Prandial insulin per carb ratio of  1 unit per 7 grams CHO 

consumed








PLAN FOR DISCHARGE:


* Patient on 3+ oral agents without A1c at goal


* If tolerated, Invokamet dose could be increased (max is 150 mg/1000 mg BID).  

Another option would be to add a once daily dose of basal insulin - would 

recommend Lantus (or Basaglar) vs NPH depending on insulin insulin coverage. 

Recommend starting dosing at 15 units qHS and increase by 2 units every 3 days 

for AM BSG above 150 mg/dL.


* May be able to d/c sulfonylurea once basal insulin titrated as an outpatient

## 2018-08-07 NOTE — PROGRESS NOTE
Internal Med Progress Note


Date of Service:


Aug 7, 2018.


Provider Documentation:





SUBJECTIVE:





The patient was seen and examined in telemetry unit


He has significant medical history including atrial fibrillation and severe 

aortic stenosis


He was admitted with them left leg cellulitis with CHF and severe aortic 

stenosis


Feels a lot better and denies any symptoms


Leg swelling has improved a lot


He is waiting for cardiac cath





8/6: Denies any symptoms today except some increased irritation involving the 

catheter clinically a lot better


With much improved leg swelling


Left leg wound is better with minimal drainage but no cellulitis





8/7


No acute symptoms


Leg swelling is much better


Wound is looking better to


Awaiting cardiac cath tomorrow








OBJECTIVE:





Vital Signs-as noted below





Exam:


General-no apparent distress


Eyes-normal


ENT-normal


Neck-supple


Lungs-clear to auscultate bilaterally with minimal crackles at the bases


Heart-irregular, soft aortic sound, difficult to feel for any murmur


Abdomen-benign, soft, nontender


Extremities-chronic skin changes with chronic edema bilaterally-edema is much 

improved


No evidence of cellulitis in left leg and no ulceration


2 healed ulcerated area over the shin-dry no evidence of any discharge


Neuro-alert, awake and oriented 3


No focal neuro deficit appreciated





Lab data as noted below.


ASSESSMENT & PLAN:








This is a 67 year old male with a past medical history of DM2, HTN, HLD, 

depression/anxiety, paroxysmal atrial fibrillation on long-term anticoagulation

, hypothyroidism - presents with nausea and shortness of breath





Likely Chronic Atrial Fibrillation with RVR


- patient with a hx. of atrial fibrillation, presented with rapid ventricular 

response


- initially started on a Cardizem drip; now back on metoprolol tartrate


-Continue Pradaxa for now and adjust as needed for any procedure


- appreciate cardiology input


-Clinically a lot better, denies any symptoms


-Waiting for cardiac cath-tomorrow








Newly Diagnosed Critically Severe Aortic Stenosis


-Containing strict intake output 


- EF of around 50-55%


- will need outpatient aortic valve work-up; likely surgical repair at tertiary 

care center


-We will go for cardiac cath in a day or 2, preop evaluation for possible 

aortic valve replacement


-Cardiac cath on 8/8/18





Acute Diastolic Heart Failure


Decompensated diastolic heart failure in setting of rapid atrial fibrillation 

and severe aortic stenosis


Receiving IV Lasix initially with good results


Leg swelling is much better


We will continue the same for now


Has been diuresing quite well





Peripheral Vascular Disease


LLE Wound Ulcerations


- arterial disease noted; L peroneal artery stenosis


- Dr. Ramírez consulted for vascular management


- once optimized, can d/c home


- changed IV abx. to Doxycycline


- ID consulted for further input, antibiotic for 14 days in total


-Restarted doxycycline and to continue 14 days of antibiotic and total


-Left leg wound is much better








Mild Ileus


- spoke with GI, this likely does not represent ileus, though read as such on CT


- will give clears, stop IVFs, and advance diet as tolerated


-No acute issue-moving bowels normally





DM2


- ha1c of 8.9%, not well controlled


- currently on 4 oral agents as outpatient


- will stop oral agents and insulin sliding scale with Lantus


- appreciate glycemic control consultation





Hypothyroidism


- continue Synthroid





DVT ppx


- Pradaxa





FULL CODE





Cardiac cath tomorrow and further recommendation following that


Vital Signs:











  Date Time  Temp Pulse Resp B/P (MAP) Pulse Ox O2 Delivery O2 Flow Rate FiO2


 


8/7/18 11:06 37.1 79 20 114/70 (85) 98 Room Air  


 


8/7/18 08:00     97 Room Air  


 


8/7/18 06:55 36.7 78 18 114/76 (89) 97 Room Air  


 


8/7/18 03:49 36.9 76 21 113/51 (71) 98 Room Air  


 


8/7/18 00:02      Room Air  


 


8/6/18 23:33 36.6 79 21 126/79 (95) 98 Room Air  


 


8/6/18 19:30 36.9 86 20 116/73 (87) 97 Room Air  








Lab Results:





Results Past 24 Hours








Test


  8/6/18


16:05 8/6/18


20:12 8/7/18


06:54 8/7/18


08:39 Range/Units


 


 


Bedside Glucose 122 131 135  70-99  mg/dl


 


Sodium Level    137 136-145  mmol/L


 


Potassium Level    4.1 3.5-5.1  mmol/L


 


Chloride Level    103   mmol/L


 


Carbon Dioxide Level    24 21-32  mmol/L


 


Anion Gap    9.0 3-11  mmol/L


 


Blood Urea Nitrogen    19 7-18  mg/dl


 


Creatinine


  


  


  


  0.89


  0.60-1.40


mg/dl


 


Est Creatinine Clear Calc


Drug Dose 


  


  


  100.3


   ml/min


 


 


Estimated GFR (


American) 


  


  


  101.8


   


 


 


Estimated GFR (Non-


American 


  


  


  87.9


   


 


 


BUN/Creatinine Ratio    20.7 10-20  


 


Random Glucose    211 70-99  mg/dl


 


Calcium Level    9.3 8.5-10.1  mg/dl


 


Test


  8/7/18


11:16 


  


  


  Range/Units


 


 


Bedside Glucose 136    70-99  mg/dl

## 2018-08-07 NOTE — CARDIOLOGY FOLLOW-UP
Subjective


General


Date of Service:


Aug 7, 2018.


Chief Complaint:  Follow-up atrial fibrillation, edema, aortic stenosis


Pt evaluation today including:  conversation w/ patient, physical exam, chart 

review, lab review, review of studies, review of inpatient medication list





History of Present Illness


The patient is a 67 year old male seen in follow-up.  Denies shortness of breath

, orthopnea, or PND.  Rate controlled atrial fibrillation on telemetry.  Renal 

function remained stable.





Allergies


Coded Allergies:  


     No Known Allergies (Unverified , 7/31/18)





Social History


Smoking Status:  Former Smoker


Hx Tobacco Use In Past Year?:  No


Hx Alcohol Use - Type And Amou:  No


Hx Substance Use - Type And Am:  No





Problem List


Medical Problems:


(1) Atrial fibrillation with rapid ventricular response


Status: Acute  





(2) Ileus


Status: Acute  





(3) Nausea


Status: Acute  











Review of Systems


Respiratory:  + dyspnea on exertion, No cough, No wheezing, No shortness of 

breath, No dyspnea at rest, No hemoptysis


Cardiac:  + edema, No chest pain, No orthopnea, No PND, No claudication, No 

palpitations





Physical Exam


Vital Signs





Last Vital Signs Documentation








  Date Time  Temp Pulse Resp B/P (MAP) Pulse Ox O2 Delivery O2 Flow Rate FiO2


 


8/7/18 08:00     97 Room Air  


 


8/7/18 06:55 36.7 78 18 114/76 (89)    


 


8/4/18 08:00       2.0 











Physical Exam


Constitutional:  


   Level of Distress:  chronically ill


Head:  normocephalic


Neck:  supple


Lungs:  


   Auscultation:  no wheezing, no rhonchi, pertinent finding (Decreased breath 

sounds at the bases with scant crackles)


Cardiovascular:  


   Heart Auscultation:  irregular rate rhythm, pertinent finding (The patient's 

murmur is very faint and minimally audible)


Abdomen:  


   Inspection & Palpation:  soft, non-distended


Extremities:  pertinent finding (Venous stasis changes, 1+ bilateral pretibial 

edema with stasis changes.)


Neurologic:  


   Gait & Station:  pertinent finding (No focal deficits)





Assessment and Plan


Assessment and Plan


Final impression


1.  Severe aortic stenosis


2.  Decompensated diastolic heart failure in setting of rapid atrial 

fibrillation and severe aortic stenosis


     -Volume status improved


2.  Chronic atrial fibrillation - rate controlled and chronically 

anticoagulated with Pradaxa


3.  Lower extremity edema, left lower extremity superficial ulcerations





Discussion/recommendations:


Hold Lasix and Pradaxa in anticipation of coronary angiography tomorrow.  The 

risks, benefits, and alternatives to procedure were discussed with patient at 

length.  He is agreeable.  Continue current dose of beta-blocker therapy.  

Repeat basic metabolic panel in a.m.  Plan to transition from Pradaxa to 

Coumadin post catheterization.





Laboratory Results





Last 24 Hours








Test


  8/6/18


11:19 8/6/18


16:05 8/6/18


20:12 8/7/18


06:54


 


Bedside Glucose 165 mg/dl  122 mg/dl  131 mg/dl  135 mg/dl 


 


Test


  8/7/18


08:39 


  


  


 


 


Sodium Level 137 mmol/L    


 


Potassium Level 4.1 mmol/L    


 


Chloride Level 103 mmol/L    


 


Carbon Dioxide Level 24 mmol/L    


 


Anion Gap 9.0 mmol/L    


 


Blood Urea Nitrogen 19 mg/dl    


 


Creatinine 0.89 mg/dl    


 


Est Creatinine Clear Calc


Drug Dose 100.3 ml/min 


  


  


  


 


 


Estimated GFR (


American) 101.8 


  


  


  


 


 


Estimated GFR (Non-


American 87.9 


  


  


  


 


 


BUN/Creatinine Ratio 20.7    


 


Random Glucose 211 mg/dl    


 


Calcium Level 9.3 mg/dl

## 2018-08-08 VITALS
TEMPERATURE: 98.06 F | DIASTOLIC BLOOD PRESSURE: 87 MMHG | HEART RATE: 67 BPM | SYSTOLIC BLOOD PRESSURE: 128 MMHG | OXYGEN SATURATION: 96 %

## 2018-08-08 VITALS
DIASTOLIC BLOOD PRESSURE: 78 MMHG | SYSTOLIC BLOOD PRESSURE: 129 MMHG | TEMPERATURE: 98.24 F | HEART RATE: 80 BPM | OXYGEN SATURATION: 97 %

## 2018-08-08 VITALS — DIASTOLIC BLOOD PRESSURE: 71 MMHG | SYSTOLIC BLOOD PRESSURE: 100 MMHG | OXYGEN SATURATION: 96 % | HEART RATE: 78 BPM

## 2018-08-08 VITALS
OXYGEN SATURATION: 95 % | TEMPERATURE: 97.88 F | SYSTOLIC BLOOD PRESSURE: 113 MMHG | DIASTOLIC BLOOD PRESSURE: 77 MMHG | HEART RATE: 80 BPM

## 2018-08-08 VITALS
TEMPERATURE: 97.7 F | DIASTOLIC BLOOD PRESSURE: 80 MMHG | OXYGEN SATURATION: 98 % | SYSTOLIC BLOOD PRESSURE: 126 MMHG | HEART RATE: 77 BPM

## 2018-08-08 VITALS — OXYGEN SATURATION: 98 % | SYSTOLIC BLOOD PRESSURE: 118 MMHG | DIASTOLIC BLOOD PRESSURE: 79 MMHG | HEART RATE: 85 BPM

## 2018-08-08 VITALS — OXYGEN SATURATION: 97 % | HEART RATE: 79 BPM | DIASTOLIC BLOOD PRESSURE: 67 MMHG | SYSTOLIC BLOOD PRESSURE: 107 MMHG

## 2018-08-08 VITALS
SYSTOLIC BLOOD PRESSURE: 137 MMHG | TEMPERATURE: 98.06 F | HEART RATE: 83 BPM | OXYGEN SATURATION: 97 % | DIASTOLIC BLOOD PRESSURE: 83 MMHG

## 2018-08-08 VITALS — DIASTOLIC BLOOD PRESSURE: 81 MMHG | SYSTOLIC BLOOD PRESSURE: 119 MMHG | OXYGEN SATURATION: 97 % | HEART RATE: 76 BPM

## 2018-08-08 VITALS
DIASTOLIC BLOOD PRESSURE: 80 MMHG | OXYGEN SATURATION: 99 % | TEMPERATURE: 97.7 F | HEART RATE: 73 BPM | SYSTOLIC BLOOD PRESSURE: 129 MMHG

## 2018-08-08 VITALS
DIASTOLIC BLOOD PRESSURE: 83 MMHG | HEART RATE: 83 BPM | OXYGEN SATURATION: 97 % | SYSTOLIC BLOOD PRESSURE: 137 MMHG | TEMPERATURE: 98.06 F

## 2018-08-08 VITALS — HEART RATE: 74 BPM | DIASTOLIC BLOOD PRESSURE: 67 MMHG | SYSTOLIC BLOOD PRESSURE: 125 MMHG | OXYGEN SATURATION: 96 %

## 2018-08-08 VITALS
TEMPERATURE: 97.88 F | HEART RATE: 93 BPM | DIASTOLIC BLOOD PRESSURE: 76 MMHG | SYSTOLIC BLOOD PRESSURE: 114 MMHG | OXYGEN SATURATION: 97 %

## 2018-08-08 VITALS — HEART RATE: 80 BPM | OXYGEN SATURATION: 97 % | DIASTOLIC BLOOD PRESSURE: 90 MMHG | SYSTOLIC BLOOD PRESSURE: 133 MMHG

## 2018-08-08 VITALS — HEART RATE: 81 BPM | SYSTOLIC BLOOD PRESSURE: 110 MMHG | DIASTOLIC BLOOD PRESSURE: 69 MMHG | OXYGEN SATURATION: 97 %

## 2018-08-08 VITALS
HEART RATE: 76 BPM | TEMPERATURE: 97.88 F | OXYGEN SATURATION: 97 % | DIASTOLIC BLOOD PRESSURE: 75 MMHG | SYSTOLIC BLOOD PRESSURE: 109 MMHG

## 2018-08-08 VITALS — OXYGEN SATURATION: 97 % | HEART RATE: 88 BPM | SYSTOLIC BLOOD PRESSURE: 114 MMHG | DIASTOLIC BLOOD PRESSURE: 75 MMHG

## 2018-08-08 VITALS
DIASTOLIC BLOOD PRESSURE: 84 MMHG | SYSTOLIC BLOOD PRESSURE: 134 MMHG | HEART RATE: 68 BPM | TEMPERATURE: 98.06 F | OXYGEN SATURATION: 98 %

## 2018-08-08 LAB
BUN SERPL-MCNC: 18 MG/DL (ref 7–18)
CALCIUM SERPL-MCNC: 9.4 MG/DL (ref 8.5–10.1)
CO2 SERPL-SCNC: 26 MMOL/L (ref 21–32)
CREAT SERPL-MCNC: 0.82 MG/DL (ref 0.6–1.4)
GLUCOSE SERPL-MCNC: 133 MG/DL (ref 70–99)
POTASSIUM SERPL-SCNC: 4 MMOL/L (ref 3.5–5.1)
SODIUM SERPL-SCNC: 138 MMOL/L (ref 136–145)

## 2018-08-08 PROCEDURE — B2111ZZ FLUOROSCOPY OF MULTIPLE CORONARY ARTERIES USING LOW OSMOLAR CONTRAST: ICD-10-PCS | Performed by: INTERNAL MEDICINE

## 2018-08-08 RX ADMIN — INSULIN GLARGINE SCH UNITS: 100 INJECTION, SOLUTION SUBCUTANEOUS at 20:58

## 2018-08-08 RX ADMIN — GEMFIBROZIL SCH MG: 600 TABLET ORAL at 08:14

## 2018-08-08 RX ADMIN — INSULIN GLARGINE SCH UNITS: 100 INJECTION, SOLUTION SUBCUTANEOUS at 08:13

## 2018-08-08 RX ADMIN — INSULIN ASPART SCH UNITS: 100 INJECTION, SOLUTION INTRAVENOUS; SUBCUTANEOUS at 20:37

## 2018-08-08 RX ADMIN — DOXYCYCLINE HYCLATE SCH MG: 100 CAPSULE, GELATIN COATED ORAL at 20:56

## 2018-08-08 RX ADMIN — SPIRONOLACTONE SCH MG: 25 TABLET, FILM COATED ORAL at 08:14

## 2018-08-08 RX ADMIN — METOPROLOL TARTRATE SCH MG: 25 TABLET, FILM COATED ORAL at 20:55

## 2018-08-08 RX ADMIN — CALCIUM CARBONATE PRN MG: 500 TABLET ORAL at 22:30

## 2018-08-08 RX ADMIN — METOPROLOL TARTRATE SCH MG: 25 TABLET, FILM COATED ORAL at 08:14

## 2018-08-08 RX ADMIN — DOXYCYCLINE HYCLATE SCH MG: 100 CAPSULE, GELATIN COATED ORAL at 08:14

## 2018-08-08 RX ADMIN — GEMFIBROZIL SCH MG: 600 TABLET ORAL at 20:54

## 2018-08-08 RX ADMIN — LEVOTHYROXINE SODIUM SCH MCG: 50 TABLET ORAL at 06:22

## 2018-08-08 RX ADMIN — INSULIN ASPART SCH UNITS: 100 INJECTION, SOLUTION INTRAVENOUS; SUBCUTANEOUS at 11:46

## 2018-08-08 RX ADMIN — INSULIN ASPART SCH UNITS: 100 INJECTION, SOLUTION INTRAVENOUS; SUBCUTANEOUS at 07:00

## 2018-08-08 RX ADMIN — BUPROPION HYDROCHLORIDE SCH MG: 150 TABLET, EXTENDED RELEASE ORAL at 08:14

## 2018-08-08 RX ADMIN — ALLOPURINOL SCH MG: 100 TABLET ORAL at 08:14

## 2018-08-08 RX ADMIN — INSULIN ASPART SCH UNITS: 100 INJECTION, SOLUTION INTRAVENOUS; SUBCUTANEOUS at 17:33

## 2018-08-08 RX ADMIN — BUPROPION HYDROCHLORIDE SCH MG: 150 TABLET, EXTENDED RELEASE ORAL at 20:56

## 2018-08-08 NOTE — POST SEDATION ASSESSMENT
Post Sedation Assessment


General


Date of Sedation


Aug 8, 2018.


Vital Signs:





Vital Signs Past 12 Hours








  Date Time  Temp Pulse Resp B/P (MAP) Pulse Ox O2 Delivery O2 Flow Rate FiO2


 


8/8/18 12:10  77 18 123/83 (96) 98 Room Air  


 


8/8/18 10:56 36.7 67 19 128/87 (101) 96 Room Air  


 


8/8/18 08:00      Room Air  


 


8/8/18 06:56 36.5 77 20 126/80 (95) 98 Room Air  


 


8/8/18 03:02 36.6 76 18 109/75 (86) 97 Room Air  


 


8/8/18 00:30      Room Air  











Post Procedure Recovery Score


Activity:  (2) Moves 4 extremities *


Respiration:  (2) Deep breath/cough


Circulation:  (2) +/-20% PreAnes Value


Consciousness:  (2) Fully Awake


Oxygen Saturation:  (2) > 92% On Room Air


Post Anesthesia Score:  10





Discharge Sedation


Level of Care:  Fast Track Phase II





Post Sedation Plan


On clinical assessment, the patient appears to have tolerated the sedation 

without complications. Patient is recovering as anticipated.





Patient will continue to be monitored by nursing and may be discharged when 

sedation discharge criteria are met per below protocol. 





Upon Completions of procedure and additional 15 minutes continue every 5 minute 

vital signs and the P.A.R. score; then discharge to a Phase I or Fast Track to 

Phase II per the following guidelines:





* Discharge Patient to appropriate Phase II area if PAR is 8 or greater or 

return to pre- procedure baseline.  The post - procedure orders will be as 

directed. 





* If PAR score is less than 8 or not return to pre-procedure baseline then 

patient will follow Phase I monitoring till PAR is reached for Phase II.  The 

Phase I may be done in procedure room or may call to secure a Phase I area.





* If naloxone or flumazenil are used for reversal, hold in Phase I for an 

additional 60 -120 minutes before discharge to Phase II.  Please call the 

Sedation Physician to re-evaluate and complete post-note for discharge to Phase 

II area. 





Do NOT discharge from procedure sedation or Phase 1 until post- sedation 

evaluation note is complete by procedure /sedation MD





Sedation Discharge Instructions to be given to the patient at discharge to home.

## 2018-08-08 NOTE — PRE SEDATION ASSESSMENT
Pre Sedation Assessment


General


Date of Sedation:


Aug 8, 2018.





Vital Signs Past 12 Hours








  Date Time  Temp Pulse Resp B/P (MAP) Pulse Ox O2 Delivery O2 Flow Rate FiO2


 


8/8/18 12:10  77 18 123/83 (96) 98 Room Air  


 


8/8/18 10:56 36.7 67 19 128/87 (101) 96 Room Air  


 


8/8/18 08:00      Room Air  


 


8/8/18 06:56 36.5 77 20 126/80 (95) 98 Room Air  


 


8/8/18 03:02 36.6 76 18 109/75 (86) 97 Room Air  


 


8/8/18 00:30      Room Air  











Review


Cardiovascular:  + systolic murmur, + irregularly irregular


Lungs:  lungs clear





Pre-Sedation Airway Assessment


Smoking Status:  Former Smoker


Hx of Sleep Apnea:  No


Thyro-mental Distance:  > 3 Finger Breadths


Oral Cavity:  Dentures


Mallampati Classification:  Class II


ASA Classification:  Class II





NPO Status


Date of Last Intake of Fluids:  Aug 7, 2018


Date of Last Intake of Solids:  Aug 7, 2018





Procedure Planning


Contraindications for Sedation:  None


Current Medications Reviewed:  Yes





Notes








The planned sedation has been discussed with the patient. Informed Consent was 

obtained.  I have identified the patient, determined the appropriateness of 

sedation and have assessed the patient immediately prior to the procedure.  





All medicine(s) and interventions are by my order.

## 2018-08-08 NOTE — CARDIAC CATHETERIZATION
Procedure Note


Procedure Date


Aug 8, 2018.





Pre-Procedure Diagnosis


Valvular Disease, CHF





AUC Score


8





Post-Procedure Diagnosis


Moderate CAD





Procedure(s) Performed


Coronary Angiography





Cardiologist


Dr. Khanna





Assistant(s)


Sam GONZALEZ





Estimated Blood Loss


5cc





Medication(s)


Fentanyl, Versed, Lidocaine 1%





Summary of Findings


Moderate non-obstructive CAD





Hemodynamics


Rest Ao:


114/87/68


Final Ao:


113/82/72


LV:


N/A





Recommendations


valve replacement





Specimens


None





Radiation Exposure (mGy)


1106





Contrast (mls)


50





Anesthesia


Moderate Sedation. Start 1139.  End 1213.





Procedural Complication(s)


None





Disposition


PCU





ACC Data


Cardiac Status


Clinical evaluation leading to the procedure


CAD Presntation:  Unstable angina


Heart Failure:  NYHA Class: CCS III


Cardiogenic Shock w/in 24Hrs:  No


Cardiac Arrest w/in 24Hrs:  No


Imaging studies past 6 months:  Yes


Stress studies past 6 months:  No





Coronary Anatomy


Dominant:  Right


Left Main (% Stenosis):  Normal


LAD (% Stenosis):  Mid (40% followed by 50%)


D1 (% Stenosis):  Normal


D2 (% Stenosis):  Normal


Circumflex (% Stenosis):  Mid (20-30% at bifurcation of OM and Lcx)


OM1 (% Stenosis):  Mid (10%)


RCA (% Stenosis):  Proximal (10%), Mid (10%), Distal (10%)


R PDA (% Stenosis):  Normal


R PL1 (% Stenosis):  Normal





Diagnostic


Status:  Elective





Closure Device


Percutaneous Entry Location:  Femoral


Closure Device:  Mynx


Recommendations:  valve replacement





Intraprocedure Events


Significant Dissection:  No


Perforation:  No

## 2018-08-08 NOTE — PROGRESS NOTE
Internal Med Progress Note


Date of Service:


Aug 8, 2018.


Provider Documentation:





SUBJECTIVE:





The patient was seen and examined in telemetry unit


He has significant medical history including atrial fibrillation and severe 

aortic stenosis


He was admitted with them left leg cellulitis with CHF and severe aortic 

stenosis


Feels a lot better and denies any symptoms


Leg swelling has improved a lot


He is waiting for cardiac cath





8/6: Denies any symptoms today except some increased irritation involving the 

catheter clinically a lot better


With much improved leg swelling


Left leg wound is better with minimal drainage but no cellulitis





8/7


No acute symptoms


Leg swelling is much better


Wound is looking better to


Awaiting cardiac cath tomorrow





8/8: Going for cardiac cath this morning


Denies any symptoms and no arrhythmia noted in monitors








OBJECTIVE:





Vital Signs-as noted below





Exam:


General-no apparent distress


Eyes-normal


ENT-normal


Neck-supple


Lungs-clear to auscultate bilaterally with minimal crackles at the bases


Heart-irregular, soft aortic sound, difficult to feel for any murmur


Abdomen-benign, soft, nontender


Extremities-chronic skin changes with chronic edema bilaterally-edema is much 

improved


No evidence of cellulitis in left leg and no ulceration


2 healed ulcerated area over the shin-dry no evidence of any discharge


Neuro-alert, awake and oriented 3


No focal neuro deficit appreciated





Lab data as noted below.


ASSESSMENT & PLAN:








This is a 67 year old male with a past medical history of DM2, HTN, HLD, 

depression/anxiety, paroxysmal atrial fibrillation on long-term anticoagulation

, hypothyroidism - presents with nausea and shortness of breath





Likely Chronic Atrial Fibrillation with RVR


- patient with a hx. of atrial fibrillation, presented with rapid ventricular 

response


- initially started on a Cardizem drip; now back on metoprolol tartrate


-Continue Pradaxa for now and adjust as needed for any procedure


- appreciate cardiology input


-Clinically a lot better, denies any symptoms


-Cardiac cath today and depending on the findings further management plan will 

be determined











Newly Diagnosed Critically Severe Aortic Stenosis


-Containing strict intake output 


- EF of around 50-55%


- will need outpatient aortic valve work-up; likely surgical repair at tertiary 

care center


-We will go for cardiac cath in a day or 2, preop evaluation for possible 

aortic valve replacement


-Cardiac cath on 8/8/18


--Likely to go to [place] for aortic valve surgery after the cardiac cath





Acute Diastolic Heart Failure


Decompensated diastolic heart failure in setting of rapid atrial fibrillation 

and severe aortic stenosis


Receiving IV Lasix initially with good results


Leg swelling is much better


We will continue the same for now


Has been diuresing quite well


Denies any symptoms suggestive of any fluid overload





Peripheral Vascular Disease


LLE Wound Ulcerations


- arterial disease noted; L peroneal artery stenosis


- Dr. Ramírez consulted for vascular management


- once optimized, can d/c home


- changed IV abx. to Doxycycline


- ID consulted for further input, antibiotic for 14 days in total


-Restarted doxycycline and to continue 14 days of antibiotic and total


-Left leg wound is much better








Mild Ileus


- spoke with GI, this likely does not represent ileus, though read as such on CT


- will give clears, stop IVFs, and advance diet as tolerated


-No acute issue-moving bowels normally





DM2


- ha1c of 8.9%, not well controlled


- currently on 4 oral agents as outpatient


- will stop oral agents and insulin sliding scale with Lantus


- appreciate glycemic control consultation





Hypothyroidism


- continue Synthroid





DVT ppx


- Pradaxa





FULL CODE





Cardiac cath tomorrow and further recommendation following that


Vital Signs:











  Date Time  Temp Pulse Resp B/P (MAP) Pulse Ox O2 Delivery O2 Flow Rate FiO2


 


8/8/18 08:00      Room Air  


 


8/8/18 06:56 36.5 77 20 126/80 (95) 98 Room Air  


 


8/8/18 03:02 36.6 76 18 109/75 (86) 97 Room Air  


 


8/8/18 00:30      Room Air  


 


8/8/18 00:16 36.5 73 18 129/80 (96) 99 Room Air  


 


8/7/18 20:34 36.7 68 18 134/84 (101) 98 Room Air  


 


8/7/18 16:00     93 Room Air  


 


8/7/18 15:50 36.7 78 18 100/62 (75) 93 Room Air  


 


8/7/18 11:06 37.1 79 20 114/70 (85) 98 Room Air  








Lab Results:





Results Past 24 Hours








Test


  8/7/18


11:16 8/7/18


16:16 8/7/18


20:23 8/8/18


06:12 Range/Units


 


 


Bedside Glucose 136 108 129  70-99  mg/dl


 


Sodium Level    138 136-145  mmol/L


 


Potassium Level    4.0 3.5-5.1  mmol/L


 


Chloride Level    105   mmol/L


 


Carbon Dioxide Level    26 21-32  mmol/L


 


Anion Gap    7.0 3-11  mmol/L


 


Blood Urea Nitrogen    18 7-18  mg/dl


 


Creatinine


  


  


  


  0.82


  0.60-1.40


mg/dl


 


Est Creatinine Clear Calc


Drug Dose 


  


  


  108.8


   ml/min


 


 


Estimated GFR (


American) 


  


  


  105.3


   


 


 


Estimated GFR (Non-


American 


  


  


  90.9


   


 


 


BUN/Creatinine Ratio    22.0 10-20  


 


Random Glucose    133 70-99  mg/dl


 


Calcium Level    9.4 8.5-10.1  mg/dl


 


Test


  8/8/18


07:20 


  


  


  Range/Units


 


 


Bedside Glucose 145    70-99  mg/dl

## 2020-11-19 NOTE — PROGRESS NOTE
Subjective


Date of Service:


Aug 2, 2018.


Subjective


on doxy, tolerating well. afebrile. no f/c. for outpt workup for valve 

replacement post d/c. blood cultures remain negative.





Problem List


Medical Problems:


(1) Atrial fibrillation with rapid ventricular response


Status: Acute  





(2) Ileus


Status: Acute  





(3) Nausea


Status: Acute  











Objective


Vital Signs











  Date Time  Temp Pulse Resp B/P (MAP) Pulse Ox O2 Delivery O2 Flow Rate FiO2


 


8/2/18 11:57 36.8 128 18 138/84 (102) 93 Room Air  


 


8/2/18 08:30      Room Air  


 


8/2/18 06:59 37.1 113 17 117/76 (90) 95 Nasal Cannula 1.0 


 


8/2/18 04:44 37.1 109 16 112/74 (87) 97 Nasal Cannula 1.0 


 


8/2/18 00:01      Room Air  


 


8/1/18 23:26 37.5 108 16 107/71 (83) 97 Nasal Cannula 1.0 


 


8/1/18 20:00      Room Air  


 


8/1/18 19:37 36.7 93 16 109/73 (85) 96 Room Air  


 


8/1/18 15:39 36.9 78 19 104/69 (81) 96 Room Air  











Physical Exam


Comments:











Item Value  Date Time


 


Blood Culture - Preliminary Resulted 7/31/18 1926





Blood NO GROWTH TO DATE. 


 


Blood Culture - Preliminary Resulted 7/31/18 1926





Blood NO GROWTH TO DATE. 











Laboratory Results





Last 24 Hours








Test


  8/1/18


17:06 8/1/18


20:17 8/2/18


05:42 8/2/18


07:44


 


Bedside Glucose 130 mg/dl  126 mg/dl   111 mg/dl 


 


White Blood Count   12.53 K/uL  


 


Red Blood Count   5.34 M/uL  


 


Hemoglobin   15.6 g/dL  


 


Hematocrit   47.0 %  


 


Mean Corpuscular Volume   88.0 fL  


 


Mean Corpuscular Hemoglobin   29.2 pg  


 


Mean Corpuscular Hemoglobin


Concent 


  


  33.2 g/dl 


  


 


 


RDW Standard Deviation   51.5 fL  


 


RDW Coefficient of Variation   16.0 %  


 


Platelet Count   120 K/uL  


 


Mean Platelet Volume   12.4 fL  


 


Platelet Estimate   DECREASED  


 


Sodium Level   134 mmol/L  


 


Potassium Level   3.6 mmol/L  


 


Chloride Level   101 mmol/L  


 


Carbon Dioxide Level   27 mmol/L  


 


Anion Gap   6.0 mmol/L  


 


Blood Urea Nitrogen   21 mg/dl  


 


Creatinine   0.89 mg/dl  


 


Est Creatinine Clear Calc


Drug Dose 


  


  101.1 ml/min 


  


 


 


Estimated GFR (


American) 


  


  101.8 


  


 


 


Estimated GFR (Non-


American 


  


  87.9 


  


 


 


BUN/Creatinine Ratio   23.6  


 


Random Glucose   102 mg/dl  


 


Calcium Level   8.5 mg/dl  


 


Magnesium Level   2.5 mg/dl  


 


Test


  8/2/18


11:02 


  


  


 


 


Bedside Glucose 191 mg/dl    











Assessment and Plan





(1) Leg ulcer


Assessment & Plan:  continue doxy x 14 days total, would benefit from outpt 

wound care post d/c. Complex Repair And Flap Additional Text (Will Appearing After The Standard Complex Repair Text): The complex repair was not sufficient to completely close the primary defect. The remaining additional defect was repaired with the flap mentioned below.

## 2023-12-05 NOTE — PROGRESS NOTE
Subjective


Date of Service:


Aug 2, 2018.


Subjective


Pt evaluation today including:  conversation w/ patient, physical exam, lab 

review, review of studies, review of inpatient medication list


Saw/examined the patient in room 230-2


He has some dyspnea even while he's laying


Denies chest pain


lower extremity swelling persists





Problem List


Medical Problems:


(1) Atrial fibrillation with rapid ventricular response


Status: Acute  





(2) Ileus


Status: Acute  





(3) Nausea


Status: Acute  











Review of Systems


Constitutional:  No fever, No chills


Respiratory:  + shortness of breath, + dyspnea on exertion, + dyspnea at rest, 

No cough, No sputum, No wheezing, No hemoptysis


Cardiac:  + edema, No chest pain, No palpitations





Medications





Current Inpatient Medications








 Medications


  (Trade)  Dose


 Ordered  Sig/Bella


 Route  Start Time


 Stop Time Status Last Admin


Dose Admin


 


 Ioversol


  (Optiray 320)  125 ml  UD  PRN


 IV  7/31/18 17:15


 8/4/18 17:14   


 


 


 Acetaminophen


  (Tylenol Tab)  650 mg  Q4H  PRN


 PO  7/31/18 18:30


 8/30/18 18:29   


 


 


 Ondansetron HCl


  (Zofran Inj)  4 mg  Q6H  PRN


 IV  7/31/18 18:30


 8/30/18 18:29   


 


 


 Miscellaneous


 Information


  (Consult


 Glycemic


 Management


 Pharmacy)  1 ea  UD  PRN


 N/A  7/31/18 19:06


 8/30/18 19:05   


 


 


 Glucose


  (Glucose 40% Gel)  15-30


 GRAMS 15


 GRAMS...  UD  PRN


 PO  7/31/18 18:30


 8/30/18 18:29   


 


 


 Glucose


  (Glucose Chew


 Tab)  4-8


 Tablets 4


 Tabl...  UD  PRN


 PO  7/31/18 18:30


 8/30/18 18:29   


 


 


 Dextrose


  (Dextrose 50%


 50ML Syringe)  25-50ML


 25ML FOR


 ...  UD  PRN


 IV  7/31/18 18:30


 8/30/18 18:29   


 


 


 Glucagon


  (Glucagon Inj)  1 mg  UD  PRN


 SQ  7/31/18 18:30


 8/30/18 18:29   


 


 


 Carbohydrates


  (Carbohydrates


 For Hypoglycemia)  15-30 GRAMS


 15 grams if


 BSG 54-69...  UD  PRN


 PO  7/31/18 18:30


 8/30/18 18:29   


 


 


 Allopurinol


  (Zyloprim Tab)  100 mg  DAILY


 PO  8/1/18 09:00


 8/31/18 08:59  8/2/18 08:23


100 MG


 


 Bupropion HCl


  (Wellbutrin-Sr


 Tab)  150 mg  BID


 PO  7/31/18 21:00


 8/30/18 20:59  8/2/18 08:23


150 MG


 


 Dabigatran


  (Pradaxa Cap)  150 mg  BID


 PO  7/31/18 21:00


 8/30/18 20:59  8/2/18 08:23


150 MG


 


 Gemfibrozil


  (Lopid Tab)  600 mg  BID


 PO  7/31/18 21:00


 8/30/18 20:59  8/2/18 08:23


600 MG


 


 Levothyroxine


 Sodium


  (Synthroid Tab)  50 mcg  DAILYBB


 PO  8/1/18 06:00


 8/31/18 05:59  8/2/18 06:01


50 MCG


 


 Magnesium Oxide


  (Mag-Ox Tab)  400 mg  BID


 PO  7/31/18 21:00


 8/30/18 20:59  8/2/18 08:23


400 MG


 


 Insulin Glargine


  (Lantus Solostar


 Pen)  see


 protocol


 text  BID


 SC  8/1/18 09:45


 8/31/18 09:44  8/2/18 08:26


12 UNITS


 


 Doxycycline


 Hyclate


  (Vibramycin Cap)  100 mg  BID


 PO  8/1/18 14:00


 8/3/18 13:59  8/2/18 08:23


100 MG


 


 Insulin Aspart


  (novoLOG ASPART)  **SLIDING


 SCALE**


 **If C...  ACHS


 SQ  8/1/18 17:15


 8/31/18 17:14  8/2/18 12:05


11 UNITS


 


 Metoprolol


 Tartrate


  (Lopressor Tab)  37.5 mg  BID


 PO  8/2/18 21:00


 8/30/18 20:59   


 


 


 Furosemide 40 mg/


 Syringe  4 ml @ 4


 mls/min  ONE  STAT


 IV  8/2/18 17:04


 8/2/18 17:05 UNV  


 


 


 Furosemide 20 mg/


 Syringe  2 ml @ 4


 mls/min  DAILY


 IV  8/3/18 09:00


 9/2/18 08:59 UNV  


 











Objective


Vital Signs











  Date Time  Temp Pulse Resp B/P (MAP) Pulse Ox O2 Delivery O2 Flow Rate FiO2


 


8/2/18 15:06 36.6 118 18 111/66 (81) 96 Nasal Cannula 1.0 


 


8/2/18 11:57 36.8 128 18 138/84 (102) 93 Room Air  


 


8/2/18 08:30      Room Air  


 


8/2/18 06:59 37.1 113 17 117/76 (90) 95 Nasal Cannula 1.0 


 


8/2/18 04:44 37.1 109 16 112/74 (87) 97 Nasal Cannula 1.0 


 


8/2/18 00:01      Room Air  


 


8/1/18 23:26 37.5 108 16 107/71 (83) 97 Nasal Cannula 1.0 


 


8/1/18 20:00      Room Air  


 


8/1/18 19:37 36.7 93 16 109/73 (85) 96 Room Air  











Physical Exam


General Appearance:  + mild distress


Respiratory/Chest:  + respiratory distress, + decreased breath sounds


Cardiovascular:  no murmur, + irregularly irregular


Extremities:  + swelling (+erythema of the LLE, with edema; warmth to touch), + 

pertinent finding


Neurologic/Psychiatric:  no motor/sensory deficits, alert, normal mood/affect





Laboratory Results





Last 24 Hours








Test


  8/1/18


20:17 8/2/18


05:42 8/2/18


07:44 8/2/18


11:02


 


Bedside Glucose 126 mg/dl   111 mg/dl  191 mg/dl 


 


White Blood Count  12.53 K/uL   


 


Red Blood Count  5.34 M/uL   


 


Hemoglobin  15.6 g/dL   


 


Hematocrit  47.0 %   


 


Mean Corpuscular Volume  88.0 fL   


 


Mean Corpuscular Hemoglobin  29.2 pg   


 


Mean Corpuscular Hemoglobin


Concent 


  33.2 g/dl 


  


  


 


 


RDW Standard Deviation  51.5 fL   


 


RDW Coefficient of Variation  16.0 %   


 


Platelet Count  120 K/uL   


 


Mean Platelet Volume  12.4 fL   


 


Platelet Estimate  DECREASED   


 


Sodium Level  134 mmol/L   


 


Potassium Level  3.6 mmol/L   


 


Chloride Level  101 mmol/L   


 


Carbon Dioxide Level  27 mmol/L   


 


Anion Gap  6.0 mmol/L   


 


Blood Urea Nitrogen  21 mg/dl   


 


Creatinine  0.89 mg/dl   


 


Est Creatinine Clear Calc


Drug Dose 


  101.1 ml/min 


  


  


 


 


Estimated GFR (


American) 


  101.8 


  


  


 


 


Estimated GFR (Non-


American 


  87.9 


  


  


 


 


BUN/Creatinine Ratio  23.6   


 


Random Glucose  102 mg/dl   


 


Calcium Level  8.5 mg/dl   


 


Magnesium Level  2.5 mg/dl   


 


Test


  8/2/18


16:21 


  


  


 


 


Bedside Glucose 150 mg/dl    











Assessment and Plan


This is a 67 year old male with a past medical history of DM2, HTN, HLD, 

depression/anxiety, paroxysmal atrial fibrillation on long-term anticoagulation

, hypothyroidism - presents with nausea and shortness of breath





Likely Chronic Atrial Fibrillation with RVR


8/2


- appreciate cardiology input


- Metoprolol was decreased today due to recent diagnosis of critically severe 

aortic stenosis


- continue Pradaxa





8/1


- patient with a hx. of atrial fibrillation, presented with rapid ventricular 

response


- initially started on a Cardizem drip; now back on metoprolol tartrate


- continue Pradaxa for anticoagulation


- appreciate cardiology input





Newly Diagnosed Critically Severe Aortic Stenosis


8/2


- continue with Lasix as per cardiology, will likely need diuretics on discharge





8/1


- holding IVFs, changing IV abx. to PO to prevent overload


- EF of around 50-55%


- will need outpatient aortic valve work-up; likely surgical repair at tertiary 

care center





Peripheral Vascular Disease


LLE Wound Ulcerations


8/2


- appreciate ID input


- doxycycline x 14 days


- outpatient wound care input





8/1


- arterial disease noted; L peroneal artery stenosis


- Dr. Ramírez consulted for vascular management


- once optimized, can d/c home


- changed IV abx. to Doxycycline


- ID consulted for further input, length of treatment, etc.





Mild Ileus


- spoke with GI, this likely does not represent ileus, though read as such on CT


- will give clears, stop IVFs, and advance diet as tolerated





DM2


- ha1c of 8.9%, not well controlled


- currently on 4 oral agents as outpatient


- will stop oral agents and insulin sliding scale with Lantus


- appreciate glycemic control consultation





Hypothyroidism


- continue Synthroid





DVT ppx


- Pradaxa





FULL CODE no